# Patient Record
Sex: FEMALE | Race: BLACK OR AFRICAN AMERICAN | NOT HISPANIC OR LATINO
[De-identification: names, ages, dates, MRNs, and addresses within clinical notes are randomized per-mention and may not be internally consistent; named-entity substitution may affect disease eponyms.]

---

## 2020-10-27 ENCOUNTER — APPOINTMENT (OUTPATIENT)
Dept: HUMAN REPRODUCTION | Facility: CLINIC | Age: 35
End: 2020-10-27
Payer: COMMERCIAL

## 2020-10-27 PROCEDURE — 99203 OFFICE O/P NEW LOW 30 MIN: CPT | Mod: 95

## 2021-02-03 ENCOUNTER — TRANSCRIPTION ENCOUNTER (OUTPATIENT)
Age: 36
End: 2021-02-03

## 2021-02-03 ENCOUNTER — APPOINTMENT (OUTPATIENT)
Dept: HUMAN REPRODUCTION | Facility: CLINIC | Age: 36
End: 2021-02-03
Payer: COMMERCIAL

## 2021-02-03 PROCEDURE — 36415 COLL VENOUS BLD VENIPUNCTURE: CPT

## 2021-02-03 PROCEDURE — 99072 ADDL SUPL MATRL&STAF TM PHE: CPT

## 2021-03-29 ENCOUNTER — APPOINTMENT (OUTPATIENT)
Dept: HUMAN REPRODUCTION | Facility: CLINIC | Age: 36
End: 2021-03-29
Payer: COMMERCIAL

## 2021-03-29 PROCEDURE — 76831 ECHO EXAM UTERUS: CPT

## 2021-03-29 PROCEDURE — 99215 OFFICE O/P EST HI 40 MIN: CPT | Mod: 25

## 2021-03-29 PROCEDURE — 99072 ADDL SUPL MATRL&STAF TM PHE: CPT

## 2021-03-29 PROCEDURE — 58340 CATHETER FOR HYSTEROGRAPHY: CPT

## 2021-04-07 ENCOUNTER — APPOINTMENT (OUTPATIENT)
Dept: MATERNAL FETAL MEDICINE | Facility: CLINIC | Age: 36
End: 2021-04-07
Payer: COMMERCIAL

## 2021-04-07 DIAGNOSIS — Z31.69 ENCOUNTER FOR OTHER GENERAL COUNSELING AND ADVICE ON PROCREATION: ICD-10-CM

## 2021-04-07 DIAGNOSIS — Z78.9 OTHER SPECIFIED HEALTH STATUS: ICD-10-CM

## 2021-04-07 DIAGNOSIS — E66.9 OBESITY, UNSPECIFIED: ICD-10-CM

## 2021-04-07 DIAGNOSIS — Z82.49 FAMILY HISTORY OF ISCHEMIC HEART DISEASE AND OTHER DISEASES OF THE CIRCULATORY SYSTEM: ICD-10-CM

## 2021-04-07 DIAGNOSIS — Z83.3 FAMILY HISTORY OF DIABETES MELLITUS: ICD-10-CM

## 2021-04-07 DIAGNOSIS — E28.2 POLYCYSTIC OVARIAN SYNDROME: ICD-10-CM

## 2021-04-07 DIAGNOSIS — Z87.42 PERSONAL HISTORY OF OTHER DISEASES OF THE FEMALE GENITAL TRACT: ICD-10-CM

## 2021-04-07 DIAGNOSIS — Z80.3 FAMILY HISTORY OF MALIGNANT NEOPLASM OF BREAST: ICD-10-CM

## 2021-04-07 DIAGNOSIS — Z86.19 PERSONAL HISTORY OF OTHER INFECTIOUS AND PARASITIC DISEASES: ICD-10-CM

## 2021-04-07 DIAGNOSIS — N80.0 ENDOMETRIOSIS OF UTERUS: ICD-10-CM

## 2021-04-07 PROBLEM — Z00.00 ENCOUNTER FOR PREVENTIVE HEALTH EXAMINATION: Status: ACTIVE | Noted: 2021-04-07

## 2021-04-07 PROCEDURE — 99215 OFFICE O/P EST HI 40 MIN: CPT | Mod: 95

## 2021-05-03 ENCOUNTER — APPOINTMENT (OUTPATIENT)
Dept: HUMAN REPRODUCTION | Facility: CLINIC | Age: 36
End: 2021-05-03
Payer: COMMERCIAL

## 2021-05-03 VITALS — WEIGHT: 172 LBS | BODY MASS INDEX: 30.48 KG/M2 | HEIGHT: 63 IN

## 2021-05-03 PROBLEM — Z82.49 FAMILY HISTORY OF CORONARY ARTERY DISEASE: Status: ACTIVE | Noted: 2021-05-03

## 2021-05-03 PROBLEM — Z78.9 SOCIAL ALCOHOL USE: Status: ACTIVE | Noted: 2021-05-03

## 2021-05-03 PROBLEM — Z82.49 FAMILY HISTORY OF HYPERTENSION: Status: ACTIVE | Noted: 2021-05-03

## 2021-05-03 PROBLEM — Z83.3 FAMILY HISTORY OF DIABETES MELLITUS: Status: ACTIVE | Noted: 2021-05-03

## 2021-05-03 PROBLEM — Z86.19 HISTORY OF CHLAMYDIA INFECTION: Status: RESOLVED | Noted: 2021-05-03 | Resolved: 2021-05-03

## 2021-05-03 PROBLEM — E28.2 POLYCYSTIC OVARY SYNDROME: Status: ACTIVE | Noted: 2021-05-03

## 2021-05-03 PROBLEM — Z31.69 ENCOUNTER FOR PRECONCEPTION CONSULTATION: Status: ACTIVE | Noted: 2021-05-03

## 2021-05-03 PROBLEM — E66.9 OBESITY (BMI 30-39.9): Status: ACTIVE | Noted: 2021-04-07

## 2021-05-03 PROBLEM — Z78.9 DOES NOT USE ILLICIT DRUGS: Status: ACTIVE | Noted: 2021-05-03

## 2021-05-03 PROBLEM — N80.0 UTERUS, ADENOMYOSIS: Status: ACTIVE | Noted: 2021-05-03

## 2021-05-03 PROBLEM — Z80.3 FAMILY HISTORY OF MALIGNANT NEOPLASM OF BREAST: Status: ACTIVE | Noted: 2021-05-03

## 2021-05-03 PROBLEM — Z87.42 HISTORY OF CERVICAL INCOMPETENCE: Status: ACTIVE | Noted: 2021-05-03

## 2021-05-03 PROCEDURE — 84702 CHORIONIC GONADOTROPIN TEST: CPT

## 2021-05-03 PROCEDURE — 76830 TRANSVAGINAL US NON-OB: CPT

## 2021-05-03 PROCEDURE — 83001 ASSAY OF GONADOTROPIN (FSH): CPT | Mod: QW

## 2021-05-03 PROCEDURE — 99072 ADDL SUPL MATRL&STAF TM PHE: CPT

## 2021-05-03 PROCEDURE — 99213 OFFICE O/P EST LOW 20 MIN: CPT | Mod: 25

## 2021-05-03 PROCEDURE — 36415 COLL VENOUS BLD VENIPUNCTURE: CPT

## 2021-05-03 PROCEDURE — 82670 ASSAY OF TOTAL ESTRADIOL: CPT

## 2021-05-03 RX ORDER — FEXOFENADINE HYDROCHLORIDE 60 MG/1
60 TABLET, FILM COATED ORAL
Refills: 0 | Status: ACTIVE | COMMUNITY

## 2021-05-03 RX ORDER — CHROMIUM 200 MCG
TABLET ORAL
Refills: 0 | Status: ACTIVE | COMMUNITY

## 2021-05-03 NOTE — RESULTS/DATA
[FreeTextEntry1] : 2/3/2021 labs:\par HgB 11.8 g/dL\par Hemoglobin electrophoresis normal pattern. \par Hemoglobin A1C 5.0%\par TSH 3.28 uIU.mL\par Total cholesterol 189, triglycerides 58, HDL 72, \par HBsAg NR\par Hepatitis C Ab NR\par Rubella and varicella immune\par Syphilis screen negative\par O pos, antibody screen negative\par HIV NR\par \par \par \par 3/29/2021 labs\par Prolactin 34.3 (elevated)\par COVID 19 spike antibody domain negative

## 2021-05-03 NOTE — ACTIVE PROBLEMS
[Diabetes Mellitus] : no diabetes mellitus [Hypertension] : no hypertension [Heart Disease] : no heart disease [Autoimmune Disease] : no autoimmune disease [Renal Disease] : no kidney disease, no UTI [Neurologic Disorder] : no neurologic disorder, no epilepsy [Hepatic Disorder] : no hepatitis, no liver disease [Thrombophlebitis] : no varicosities, no phlebitis [Thyroid Disorder] : no thyroid dysfunction [FreeTextEntry1] : Lenora had a blood transfusion in 2017 after dilation and curettage for retained products of conception and has no objection to transfusion of blood products in the future in case of emergency. \par \par GYN Dr. Sarah Morrow ([facility])\par

## 2021-05-03 NOTE — DISCUSSION/SUMMARY
[FreeTextEntry1] : Her problems and my suggestions for her management are summarized below.  She was extensively counseled regarding the following issues:\par \par \par COVID-19 vaccination in preconception period:\par \par I explained that the development and use of mRNA vaccines is relatively new but that mRNA technology has been studied for decades. mRNA vaccines are not live virus vaccines; instead, the utilize the body’s own cells to generate the coronavirus spike protein that then stimulates immune cells to create antibodies against COVID-19. Based on the mechanism of action of these vaccines and the demonstrated safety and efficacy in available data, it is expected that the safety and efficacy profile of mRNA vaccines for pregnant individuals or those desiring pregnancy in the near future would be similar to that observed in the non-pregnant population. \par \par I reviewed the most common side effects, including fever, which has been associated with birth defects, most commonly neural tube defects, especially in women who are not taking appropriate doses of folic acid prior to pregnancy and during the early first trimester. \par \par I discussed that the choice to receive the vaccine prior to pregnancy is based on each individual's risk profile. . I reviewed that some data suggests that pregnant women are at increased risk for ICU admission,  birth, and severe/critical illness from COVID infection as compared to non-pregnant people. \par \par I reviewed that mRNA vaccines are not thought to contribute to infertility. Additionally, it is not necessary to delay pregnancy after completing both doses of the COVID-19 vaccine based on current ACOG recommendations. ACOG also recommends that if an individual becomes pregnant after the first dose of the COVID-19 vaccine series, the second dose should be administered as indicated.\par \par \par \par Obesity:\par \par Obesity during pregnancy is associated with maternal and  risks that increase with degree of obesity. Maternal weight and BMI are independent risk factors for development hypertensive disorders of pregnancy including preeclampsia. Obesity also increases the risk of medically indicated  delivery. Obesity is risk factor for complications of  delivery, including longer operative times, and increased risk of blood loss >1000 mL, wound infection, thromboembolism, and endometritis. \par \par There is an increased risk for macrosomia independent of gestational diabetes and potential shoulder dystocia. Fetal anatomic ultrasound images may be more difficult to interpret, and we discussed the limitations of ultrasound during pregnancy in obese patients. I reviewed that The [place] [employer] recommends that all classes of obese women gain no more than a total of 11 – 20 pounds in pregnancy.\par \par I reviewed dietary changes that may help her decrease her body mass index prior to pregnancy. A nutrition consultation can be considered. I also reviewed that 30 - 45 minutes of exercise three times weekly is suggested for improved cardiovascular fitness. I explained that loss of even 10% of her body weight could have a positive impact on pregnancy and fertility outcomes. I suggested a whole food, plant-based diet with avoidance of simple carbohydrates and added sugars and packaged foods. We reviewed this diet suggestion and the overall positive health benefits that this can have, both for when she is pregnant and outside of pregnancy.\par \par \par \par History of cervical insufficiency with abdominal cerclage in situ:\par \par Insufficient evidence exists to assess whether progesterone and cerclage together have an additive effect in reducing the risk of  birth in women at high risk for  birth. However, some form of progesterone therapy, either weekly 17-hydroxy progesterone or daily vaginal progesterone, has been shown to reduce the risk of recurrent spontaneous  birth (studied independently from ultrasound indicated cerclage) and concurrent use seems reasonable.\par \par In women who have undergone transabdominal cerclage placement, planned  delivery at 36 0/7 to 37 6/7 weeks of gestation or immediately at the onset of regular uterine contractions is suggested to avoid uterine rupture. At , the infant is delivered through a hysterotomy incision made above the cerclage. The cerclage can be left in place if future pregnancies are planned. \par \par There is no contraindication to pregnancy at this time and Ms. Smith may proceed with IVF at any time. \par \par \par \par This consultation was performed via telehealth using the [employer] video chat with real-time 2-way audio visual technology. Ms. Smith provided verbal consent to use the application at the time of consultation. She was physically present in her home and I was physically present off site. No other people were present for or participated in the consultation. At the end of our visit, the patient indicated that her questions were answered, and she seemed satisfied with our discussion. 57 minutes were spent with the patient on video chat with an additional 10 minutes spent for documentation and coordination of care.  Please do not hesitate to contact me with any questions.

## 2021-05-03 NOTE — SURGICAL HISTORY
[Abn Paps] : abnormal pap smear [Infertility] : infertility [Last Pap: ___] : Last Pap: [unfilled] [Fibroids] : no fibroids [Breast Disease] : no breast disease [STI's] : no STI's [Cysts] : no cysts [OC Use] : no OC use [de-identified] : Abnormal pap 16 years ago. Chlamydia infection treated in college.

## 2021-05-03 NOTE — REVIEW OF SYSTEMS
[Heart Rate Is Fast] : the heart rate was not fast [Chest Pain] : no chest pain [Palpitations] : no palpitations [Dyspnea] : no shortness of breath [SOB on Exertion] : no shortness of breath during exertion [Abn Vag Bleeding] : no abnormal vaginal bleeding [Easy Bleeding] : does not bleed easily [Easy Bruising] : does not bruise easily

## 2021-05-03 NOTE — HISTORY OF PRESENT ILLNESS
[FreeTextEntry1] : Thank you for referring Ms. Lenora Smith for Maternal Fetal Medicine preconception consultation. She is a 36 year old  LMP 3/31/2021 with regular menses who is planning pregnancy this summer. She has a history of a 20-week pregnancy loss secondary to cervical insufficiency. She plans on FET. \par \par She has an abdominal cerclage in situ (placed in 2018 in [place]). \par \par She is no longer with her partner but his family history is negative for genetic disorders and congenital anomalies to her knowledge. \par \par She has a strong family history of breast cancer and is being [facility]. Recent mammogram was normal per her report.\par \par Her blood pressure reading in your office was 130/77 mmHg. She has been taking her blood pressure measurements at home and reports all normal readings. \par \par \par  [Patient travelled to an area with active Zika Virus transmission in the last 6 months] : Patient is trying to get pregnant and she did not travel to an area with active Zika virus transmission in the last 6 months. [Patient's partner travelled to an area with active Zika Virus transmission in the last 6 months] : Patient's partner did not travel to an area with active Zika Virus transmission in the last 6 months

## 2021-05-03 NOTE — PAST MEDICAL HISTORY
[HIV Infection] : no HIV [Exposure To Gonorrhea] : no gonorrhea [Syphilis] : no syphilis [Herpes Simplex] : no genital herpes [Hepatitis, B Virus] : no Hepatitis B

## 2021-05-20 ENCOUNTER — APPOINTMENT (OUTPATIENT)
Dept: HUMAN REPRODUCTION | Facility: CLINIC | Age: 36
End: 2021-05-20
Payer: COMMERCIAL

## 2021-05-20 ENCOUNTER — RESULT REVIEW (OUTPATIENT)
Age: 36
End: 2021-05-20

## 2021-05-20 PROCEDURE — 99213 OFFICE O/P EST LOW 20 MIN: CPT | Mod: 25

## 2021-05-20 PROCEDURE — 36415 COLL VENOUS BLD VENIPUNCTURE: CPT

## 2021-05-20 PROCEDURE — 99072 ADDL SUPL MATRL&STAF TM PHE: CPT

## 2021-05-20 PROCEDURE — 76830 TRANSVAGINAL US NON-OB: CPT

## 2021-05-20 PROCEDURE — 84702 CHORIONIC GONADOTROPIN TEST: CPT

## 2021-05-20 PROCEDURE — 83001 ASSAY OF GONADOTROPIN (FSH): CPT | Mod: QW

## 2021-05-20 PROCEDURE — 82670 ASSAY OF TOTAL ESTRADIOL: CPT

## 2021-05-27 ENCOUNTER — APPOINTMENT (OUTPATIENT)
Dept: HUMAN REPRODUCTION | Facility: CLINIC | Age: 36
End: 2021-05-27
Payer: COMMERCIAL

## 2021-05-27 PROCEDURE — 76830 TRANSVAGINAL US NON-OB: CPT

## 2021-05-27 PROCEDURE — 99072 ADDL SUPL MATRL&STAF TM PHE: CPT

## 2021-05-27 PROCEDURE — 36415 COLL VENOUS BLD VENIPUNCTURE: CPT

## 2021-05-27 PROCEDURE — 99213 OFFICE O/P EST LOW 20 MIN: CPT | Mod: 25

## 2021-06-03 ENCOUNTER — APPOINTMENT (OUTPATIENT)
Dept: HUMAN REPRODUCTION | Facility: CLINIC | Age: 36
End: 2021-06-03
Payer: COMMERCIAL

## 2021-06-03 PROCEDURE — 99213 OFFICE O/P EST LOW 20 MIN: CPT | Mod: 25

## 2021-06-03 PROCEDURE — 36415 COLL VENOUS BLD VENIPUNCTURE: CPT

## 2021-06-03 PROCEDURE — 82670 ASSAY OF TOTAL ESTRADIOL: CPT

## 2021-06-03 PROCEDURE — 84144 ASSAY OF PROGESTERONE: CPT

## 2021-06-03 PROCEDURE — 76830 TRANSVAGINAL US NON-OB: CPT

## 2021-06-03 PROCEDURE — 99072 ADDL SUPL MATRL&STAF TM PHE: CPT

## 2021-06-07 ENCOUNTER — APPOINTMENT (OUTPATIENT)
Dept: HUMAN REPRODUCTION | Facility: CLINIC | Age: 36
End: 2021-06-07
Payer: COMMERCIAL

## 2021-06-07 PROCEDURE — 36415 COLL VENOUS BLD VENIPUNCTURE: CPT

## 2021-06-07 PROCEDURE — 99072 ADDL SUPL MATRL&STAF TM PHE: CPT

## 2021-06-07 PROCEDURE — 84144 ASSAY OF PROGESTERONE: CPT

## 2021-06-08 ENCOUNTER — APPOINTMENT (OUTPATIENT)
Dept: HUMAN REPRODUCTION | Facility: CLINIC | Age: 36
End: 2021-06-08
Payer: COMMERCIAL

## 2021-06-08 PROCEDURE — 89352 THAWING CRYOPRESRVED EMBRYO: CPT

## 2021-06-08 PROCEDURE — 58974 EMBRYO TRANSFER INTRAUTERINE: CPT

## 2021-06-08 PROCEDURE — 76998 US GUIDE INTRAOP: CPT

## 2021-06-08 PROCEDURE — 89255 PREPARE EMBRYO FOR TRANSFER: CPT

## 2021-06-08 PROCEDURE — 99072 ADDL SUPL MATRL&STAF TM PHE: CPT

## 2021-06-18 ENCOUNTER — APPOINTMENT (OUTPATIENT)
Dept: HUMAN REPRODUCTION | Facility: CLINIC | Age: 36
End: 2021-06-18
Payer: COMMERCIAL

## 2021-06-18 PROCEDURE — 36415 COLL VENOUS BLD VENIPUNCTURE: CPT

## 2021-06-18 PROCEDURE — 84144 ASSAY OF PROGESTERONE: CPT

## 2021-06-18 PROCEDURE — 99072 ADDL SUPL MATRL&STAF TM PHE: CPT

## 2021-06-18 PROCEDURE — 84702 CHORIONIC GONADOTROPIN TEST: CPT

## 2021-06-21 ENCOUNTER — APPOINTMENT (OUTPATIENT)
Dept: HUMAN REPRODUCTION | Facility: CLINIC | Age: 36
End: 2021-06-21
Payer: COMMERCIAL

## 2021-06-21 PROCEDURE — 99072 ADDL SUPL MATRL&STAF TM PHE: CPT

## 2021-06-21 PROCEDURE — 84702 CHORIONIC GONADOTROPIN TEST: CPT

## 2021-06-21 PROCEDURE — 36415 COLL VENOUS BLD VENIPUNCTURE: CPT

## 2021-07-01 ENCOUNTER — APPOINTMENT (OUTPATIENT)
Dept: HUMAN REPRODUCTION | Facility: CLINIC | Age: 36
End: 2021-07-01
Payer: COMMERCIAL

## 2021-07-01 PROCEDURE — 76817 TRANSVAGINAL US OBSTETRIC: CPT

## 2021-07-01 PROCEDURE — 99072 ADDL SUPL MATRL&STAF TM PHE: CPT

## 2021-07-01 PROCEDURE — 99214 OFFICE O/P EST MOD 30 MIN: CPT | Mod: 25

## 2021-07-08 ENCOUNTER — APPOINTMENT (OUTPATIENT)
Dept: HUMAN REPRODUCTION | Facility: CLINIC | Age: 36
End: 2021-07-08
Payer: COMMERCIAL

## 2021-07-08 PROCEDURE — 99072 ADDL SUPL MATRL&STAF TM PHE: CPT

## 2021-07-08 PROCEDURE — 76817 TRANSVAGINAL US OBSTETRIC: CPT

## 2021-07-08 PROCEDURE — 99213 OFFICE O/P EST LOW 20 MIN: CPT | Mod: 25

## 2021-08-19 ENCOUNTER — ASOB RESULT (OUTPATIENT)
Age: 36
End: 2021-08-19

## 2021-08-19 ENCOUNTER — APPOINTMENT (OUTPATIENT)
Dept: ANTEPARTUM | Facility: CLINIC | Age: 36
End: 2021-08-19
Payer: COMMERCIAL

## 2021-08-19 PROCEDURE — 36416 COLLJ CAPILLARY BLOOD SPEC: CPT

## 2021-08-19 PROCEDURE — 76801 OB US < 14 WKS SINGLE FETUS: CPT

## 2021-08-19 PROCEDURE — 76813 OB US NUCHAL MEAS 1 GEST: CPT

## 2021-09-10 ENCOUNTER — ASOB RESULT (OUTPATIENT)
Age: 36
End: 2021-09-10

## 2021-09-10 ENCOUNTER — APPOINTMENT (OUTPATIENT)
Dept: ANTEPARTUM | Facility: CLINIC | Age: 36
End: 2021-09-10
Payer: COMMERCIAL

## 2021-09-10 PROCEDURE — 76815 OB US LIMITED FETUS(S): CPT

## 2021-09-10 PROCEDURE — 76817 TRANSVAGINAL US OBSTETRIC: CPT

## 2021-09-10 RX ORDER — PROGESTERONE 200 MG/1
200 CAPSULE ORAL
Qty: 90 | Refills: 3 | Status: ACTIVE | COMMUNITY
Start: 2021-09-10 | End: 1900-01-01

## 2021-10-08 ENCOUNTER — APPOINTMENT (OUTPATIENT)
Dept: ANTEPARTUM | Facility: CLINIC | Age: 36
End: 2021-10-08
Payer: COMMERCIAL

## 2021-10-08 ENCOUNTER — ASOB RESULT (OUTPATIENT)
Age: 36
End: 2021-10-08

## 2021-10-08 PROCEDURE — 76817 TRANSVAGINAL US OBSTETRIC: CPT | Mod: 59

## 2021-10-08 PROCEDURE — 76811 OB US DETAILED SNGL FETUS: CPT

## 2021-10-27 ENCOUNTER — APPOINTMENT (OUTPATIENT)
Dept: ANTEPARTUM | Facility: CLINIC | Age: 36
End: 2021-10-27
Payer: COMMERCIAL

## 2021-10-27 ENCOUNTER — ASOB RESULT (OUTPATIENT)
Age: 36
End: 2021-10-27

## 2021-10-27 PROCEDURE — 93325 DOPPLER ECHO COLOR FLOW MAPG: CPT

## 2021-10-27 PROCEDURE — 76827 ECHO EXAM OF FETAL HEART: CPT

## 2021-10-27 PROCEDURE — 76825 ECHO EXAM OF FETAL HEART: CPT

## 2021-10-27 PROCEDURE — 76817 TRANSVAGINAL US OBSTETRIC: CPT

## 2021-10-29 ENCOUNTER — APPOINTMENT (OUTPATIENT)
Dept: PEDIATRIC CARDIOLOGY | Facility: CLINIC | Age: 36
End: 2021-10-29
Payer: COMMERCIAL

## 2021-10-29 PROCEDURE — 76820 UMBILICAL ARTERY ECHO: CPT

## 2021-10-29 PROCEDURE — 76825 ECHO EXAM OF FETAL HEART: CPT

## 2021-10-29 PROCEDURE — 93325 DOPPLER ECHO COLOR FLOW MAPG: CPT | Mod: 59

## 2021-10-29 PROCEDURE — 76821 MIDDLE CEREBRAL ARTERY ECHO: CPT

## 2021-10-29 PROCEDURE — 76827 ECHO EXAM OF FETAL HEART: CPT

## 2021-11-02 ENCOUNTER — NON-APPOINTMENT (OUTPATIENT)
Age: 36
End: 2021-11-02

## 2021-12-03 ENCOUNTER — APPOINTMENT (OUTPATIENT)
Dept: ANTEPARTUM | Facility: CLINIC | Age: 36
End: 2021-12-03
Payer: COMMERCIAL

## 2021-12-03 ENCOUNTER — ASOB RESULT (OUTPATIENT)
Age: 36
End: 2021-12-03

## 2021-12-03 PROCEDURE — 76816 OB US FOLLOW-UP PER FETUS: CPT

## 2021-12-03 PROCEDURE — 76819 FETAL BIOPHYS PROFIL W/O NST: CPT

## 2021-12-10 ENCOUNTER — APPOINTMENT (OUTPATIENT)
Dept: PEDIATRIC CARDIOLOGY | Facility: CLINIC | Age: 36
End: 2021-12-10
Payer: COMMERCIAL

## 2021-12-10 PROCEDURE — 76826 ECHO EXAM OF FETAL HEART: CPT

## 2021-12-10 PROCEDURE — 76828 ECHO EXAM OF FETAL HEART: CPT

## 2021-12-10 PROCEDURE — 76821 MIDDLE CEREBRAL ARTERY ECHO: CPT

## 2021-12-10 PROCEDURE — 76820 UMBILICAL ARTERY ECHO: CPT

## 2021-12-10 PROCEDURE — 93325 DOPPLER ECHO COLOR FLOW MAPG: CPT | Mod: 59

## 2021-12-21 ENCOUNTER — NON-APPOINTMENT (OUTPATIENT)
Age: 36
End: 2021-12-21

## 2022-01-14 ENCOUNTER — FORM ENCOUNTER (OUTPATIENT)
Age: 37
End: 2022-01-14

## 2022-01-14 ENCOUNTER — APPOINTMENT (OUTPATIENT)
Age: 37
End: 2022-01-14
Payer: COMMERCIAL

## 2022-01-14 PROCEDURE — ZZZZZ: CPT

## 2022-01-15 ENCOUNTER — TRANSCRIPTION ENCOUNTER (OUTPATIENT)
Age: 37
End: 2022-01-15

## 2022-01-16 ENCOUNTER — EMERGENCY (EMERGENCY)
Facility: HOSPITAL | Age: 37
LOS: 1 days | Discharge: HOME | End: 2022-01-16
Admitting: EMERGENCY MEDICINE
Payer: COMMERCIAL

## 2022-01-16 ENCOUNTER — APPOINTMENT (OUTPATIENT)
Age: 37
End: 2022-01-16

## 2022-01-16 VITALS
SYSTOLIC BLOOD PRESSURE: 102 MMHG | DIASTOLIC BLOOD PRESSURE: 63 MMHG | RESPIRATION RATE: 18 BRPM | TEMPERATURE: 99 F | OXYGEN SATURATION: 98 % | HEART RATE: 101 BPM

## 2022-01-16 VITALS
RESPIRATION RATE: 18 BRPM | HEART RATE: 108 BPM | HEIGHT: 63 IN | SYSTOLIC BLOOD PRESSURE: 103 MMHG | WEIGHT: 184.09 LBS | OXYGEN SATURATION: 97 % | DIASTOLIC BLOOD PRESSURE: 68 MMHG | TEMPERATURE: 99 F

## 2022-01-16 DIAGNOSIS — R05.9 COUGH, UNSPECIFIED: ICD-10-CM

## 2022-01-16 DIAGNOSIS — R50.9 FEVER, UNSPECIFIED: ICD-10-CM

## 2022-01-16 DIAGNOSIS — O99.891 OTHER SPECIFIED DISEASES AND CONDITIONS COMPLICATING PREGNANCY: ICD-10-CM

## 2022-01-16 DIAGNOSIS — Z3A.35 35 WEEKS GESTATION OF PREGNANCY: ICD-10-CM

## 2022-01-16 PROCEDURE — L9998: CPT

## 2022-01-16 PROCEDURE — M0247: CPT

## 2022-01-16 RX ORDER — FAMOTIDINE 10 MG/ML
20 INJECTION INTRAVENOUS ONCE
Refills: 0 | Status: DISCONTINUED | OUTPATIENT
Start: 2022-01-16 | End: 2022-01-20

## 2022-01-16 RX ORDER — DIPHENHYDRAMINE HCL 50 MG
50 CAPSULE ORAL ONCE
Refills: 0 | Status: DISCONTINUED | OUTPATIENT
Start: 2022-01-16 | End: 2022-01-20

## 2022-01-16 RX ORDER — EPINEPHRINE 0.3 MG/.3ML
0.3 INJECTION INTRAMUSCULAR; SUBCUTANEOUS ONCE
Refills: 0 | Status: DISCONTINUED | OUTPATIENT
Start: 2022-01-16 | End: 2022-01-20

## 2022-01-16 RX ORDER — SOTROVIMAB 62.5 MG/ML
500 INJECTION, SOLUTION, CONCENTRATE INTRAVENOUS ONCE
Refills: 0 | Status: COMPLETED | OUTPATIENT
Start: 2022-01-16 | End: 2022-01-16

## 2022-01-16 RX ORDER — SODIUM CHLORIDE 9 MG/ML
250 INJECTION INTRAMUSCULAR; INTRAVENOUS; SUBCUTANEOUS
Refills: 0 | Status: COMPLETED | OUTPATIENT
Start: 2022-01-16 | End: 2022-01-16

## 2022-01-16 RX ADMIN — SOTROVIMAB 500 MILLIGRAM(S): 62.5 INJECTION, SOLUTION, CONCENTRATE INTRAVENOUS at 16:45

## 2022-01-16 RX ADMIN — SODIUM CHLORIDE 250 MILLILITER(S): 9 INJECTION INTRAMUSCULAR; INTRAVENOUS; SUBCUTANEOUS at 16:55

## 2022-01-16 RX ADMIN — SODIUM CHLORIDE 100 MILLILITER(S): 9 INJECTION INTRAMUSCULAR; INTRAVENOUS; SUBCUTANEOUS at 16:55

## 2022-01-16 RX ADMIN — SOTROVIMAB 116 MILLIGRAM(S): 62.5 INJECTION, SOLUTION, CONCENTRATE INTRAVENOUS at 16:15

## 2022-01-16 NOTE — ED PROVIDER NOTE - CLINICAL SUMMARY MEDICAL DECISION MAKING FREE TEXT BOX
Patient has history of COVID-19 symptoms for [ 5 ] days    Patient has high risk factors that include:  [  ] Chronic Kidney Disease [  ] Diabetes Mellitus [  ] Immune Suppressive [  ] Receiving Immunosuppressive Treatment [  ] Overweight/Obesity BMI greater than 25 [  ] Age greater than or equal 65 years [  ] Cardiovascular Disease [  ] HTN [  ] COPD/Other Chronic Respiratory Disease [  ] Sickle Cell Disease [  ] Congenital/Acquired Heart Disease [  ] Neurodevelopmental Disorder [  ] Medical related technology dependence [  ] Asthma/Chronic Respiratory Disease [  ] Immune Suppressive Disease [  ] Receiving Immune Suppressive Therapy [  ] Other: pregnant    Patient received prior COVID treatment that included [NA  ]  Patient provided explanation of monoclonal antibody infusion and is in agreement with treatment plan. See consent form in medical record.    [ X ] Patient received monoclonal antibody infusion with no adverse reaction. Patient planned for discharge home and follow up with outpatient [facility] program.    [  ] Patient received monoclonal antibody infusion and experienced an adverse reaction. Symptoms include [  ]. Patient treated with [  ]. Patient planned for [  ]

## 2022-01-16 NOTE — ED PROVIDER NOTE - PHYSICAL EXAMINATION
Gen: well appearing, no acute distress  Skin: warm/dry, no rash noted  Resp: breathing comfortably, speaking in full sentences, no dyspnea  Abd: gravid, soft, nontender  Neuro: alert/oriented, ambulatory

## 2022-01-16 NOTE — ED ADULT TRIAGE NOTE - CHIEF COMPLAINT QUOTE
MAb infusion . Patient alert and oriented presented to ed for MA infusion . Patient tested positive on 1/15/2022. Symptoms started on 1/14/2022 such as cough fever bodyaches chills and sorethroat . PAtient is 34 weeks pregnant. Denies any chest pain or sob .

## 2022-01-16 NOTE — ED PROVIDER NOTE - PATIENT PORTAL LINK FT
You can access the FollowMyHealth Patient Portal offered by [facility] by registering at the following website: [email or web address]. By joining [facility]’s FollowMyHealth portal, you will also be able to view your health information using other applications (apps) compatible with our system.

## 2022-01-16 NOTE — ED PROVIDER NOTE - OBJECTIVE STATEMENT
Patient is scheduled for MAB Infusion and requesting no professional billing.  36 F 35 weeks preg, + covid test yesterday here for MAD infusion.  pt reports cough, bodyaches, congestion, fever.  no chest pain/sob, abd pain, vaginal bleeding/discharge

## 2022-01-16 NOTE — ED ADULT NURSE NOTE - OBJECTIVE STATEMENT
Patient alert and oriented presented to ed for MA infusion . Patient tested positive on 1/15/2022. Symptoms started on 1/14/2022 such as cough fever bodyaches chills and sorethroat . PAtient is 34 weeks pregnant. Denies any chest pain or sob .

## 2022-01-20 ENCOUNTER — APPOINTMENT (OUTPATIENT)
Dept: ANTEPARTUM | Facility: CLINIC | Age: 37
End: 2022-01-20

## 2022-01-24 ENCOUNTER — ASOB RESULT (OUTPATIENT)
Age: 37
End: 2022-01-24

## 2022-01-24 ENCOUNTER — APPOINTMENT (OUTPATIENT)
Dept: ANTEPARTUM | Facility: CLINIC | Age: 37
End: 2022-01-24
Payer: COMMERCIAL

## 2022-01-24 ENCOUNTER — APPOINTMENT (OUTPATIENT)
Dept: ANTEPARTUM | Facility: CLINIC | Age: 37
End: 2022-01-24

## 2022-01-24 PROCEDURE — 76816 OB US FOLLOW-UP PER FETUS: CPT

## 2022-01-24 PROCEDURE — 76819 FETAL BIOPHYS PROFIL W/O NST: CPT

## 2022-01-26 ENCOUNTER — OUTPATIENT (OUTPATIENT)
Dept: OUTPATIENT SERVICES | Facility: HOSPITAL | Age: 37
LOS: 1 days | End: 2022-01-26
Payer: COMMERCIAL

## 2022-01-26 DIAGNOSIS — Z01.818 ENCOUNTER FOR OTHER PREPROCEDURAL EXAMINATION: ICD-10-CM

## 2022-01-26 LAB
HCT VFR BLD CALC: 34.7 % — SIGNIFICANT CHANGE UP (ref 34.5–45)
HGB BLD-MCNC: 11.1 G/DL — LOW (ref 11.5–15.5)
MCHC RBC-ENTMCNC: 31.3 PG — SIGNIFICANT CHANGE UP (ref 27–34)
MCHC RBC-ENTMCNC: 32 GM/DL — SIGNIFICANT CHANGE UP (ref 32–36)
MCV RBC AUTO: 97.7 FL — SIGNIFICANT CHANGE UP (ref 80–100)
NRBC # BLD: 0 /100 WBCS — SIGNIFICANT CHANGE UP (ref 0–0)
PLATELET # BLD AUTO: 302 K/UL — SIGNIFICANT CHANGE UP (ref 150–400)
RBC # BLD: 3.55 M/UL — LOW (ref 3.8–5.2)
RBC # FLD: 13.5 % — SIGNIFICANT CHANGE UP (ref 10.3–14.5)
WBC # BLD: 7.33 K/UL — SIGNIFICANT CHANGE UP (ref 3.8–10.5)
WBC # FLD AUTO: 7.33 K/UL — SIGNIFICANT CHANGE UP (ref 3.8–10.5)

## 2022-01-26 PROCEDURE — 85027 COMPLETE CBC AUTOMATED: CPT

## 2022-01-28 ENCOUNTER — OUTPATIENT (OUTPATIENT)
Dept: OUTPATIENT SERVICES | Facility: HOSPITAL | Age: 37
LOS: 1 days | End: 2022-01-28

## 2022-01-28 VITALS
HEIGHT: 64 IN | SYSTOLIC BLOOD PRESSURE: 130 MMHG | WEIGHT: 186.07 LBS | DIASTOLIC BLOOD PRESSURE: 80 MMHG | TEMPERATURE: 98 F | OXYGEN SATURATION: 98 % | RESPIRATION RATE: 16 BRPM | HEART RATE: 82 BPM

## 2022-01-28 DIAGNOSIS — Z98.890 OTHER SPECIFIED POSTPROCEDURAL STATES: Chronic | ICD-10-CM

## 2022-01-28 DIAGNOSIS — O34.40 MATERNAL CARE FOR OTHER ABNORMALITIES OF CERVIX, UNSPECIFIED TRIMESTER: ICD-10-CM

## 2022-01-28 DIAGNOSIS — O35.8XX0 MATERNAL CARE FOR OTHER (SUSPECTED) FETAL ABNORMALITY AND DAMAGE, NOT APPLICABLE OR UNSPECIFIED: ICD-10-CM

## 2022-01-28 DIAGNOSIS — O28.3 ABNORMAL ULTRASONIC FINDING ON ANTENATAL SCREENING OF MOTHER: ICD-10-CM

## 2022-01-28 DIAGNOSIS — Z87.42 PERSONAL HISTORY OF OTHER DISEASES OF THE FEMALE GENITAL TRACT: Chronic | ICD-10-CM

## 2022-01-28 LAB
APPEARANCE UR: ABNORMAL
BILIRUB UR-MCNC: NEGATIVE — SIGNIFICANT CHANGE UP
BLD GP AB SCN SERPL QL: NEGATIVE — SIGNIFICANT CHANGE UP
COLOR SPEC: SIGNIFICANT CHANGE UP
DIFF PNL FLD: NEGATIVE — SIGNIFICANT CHANGE UP
GLUCOSE UR QL: NEGATIVE — SIGNIFICANT CHANGE UP
HCT VFR BLD CALC: 35.6 % — SIGNIFICANT CHANGE UP (ref 34.5–45)
HGB BLD-MCNC: 11.9 G/DL — SIGNIFICANT CHANGE UP (ref 11.5–15.5)
KETONES UR-MCNC: NEGATIVE — SIGNIFICANT CHANGE UP
LEUKOCYTE ESTERASE UR-ACNC: NEGATIVE — SIGNIFICANT CHANGE UP
MCHC RBC-ENTMCNC: 32.5 PG — SIGNIFICANT CHANGE UP (ref 27–34)
MCHC RBC-ENTMCNC: 33.4 GM/DL — SIGNIFICANT CHANGE UP (ref 32–36)
MCV RBC AUTO: 97.3 FL — SIGNIFICANT CHANGE UP (ref 80–100)
NITRITE UR-MCNC: NEGATIVE — SIGNIFICANT CHANGE UP
NRBC # BLD: 0 /100 WBCS — SIGNIFICANT CHANGE UP
NRBC # FLD: 0 K/UL — SIGNIFICANT CHANGE UP
PH UR: 7 — SIGNIFICANT CHANGE UP (ref 5–8)
PLATELET # BLD AUTO: 300 K/UL — SIGNIFICANT CHANGE UP (ref 150–400)
PROT UR-MCNC: NEGATIVE — SIGNIFICANT CHANGE UP
RBC # BLD: 3.66 M/UL — LOW (ref 3.8–5.2)
RBC # FLD: 13.5 % — SIGNIFICANT CHANGE UP (ref 10.3–14.5)
RH IG SCN BLD-IMP: POSITIVE — SIGNIFICANT CHANGE UP
SP GR SPEC: 1.01 — SIGNIFICANT CHANGE UP (ref 1–1.05)
UROBILINOGEN FLD QL: SIGNIFICANT CHANGE UP
WBC # BLD: 8.78 K/UL — SIGNIFICANT CHANGE UP (ref 3.8–10.5)
WBC # FLD AUTO: 8.78 K/UL — SIGNIFICANT CHANGE UP (ref 3.8–10.5)

## 2022-01-28 RX ORDER — VALACYCLOVIR 500 MG/1
1 TABLET, FILM COATED ORAL
Qty: 0 | Refills: 0 | DISCHARGE

## 2022-01-28 RX ORDER — SODIUM CHLORIDE 9 MG/ML
1000 INJECTION, SOLUTION INTRAVENOUS
Refills: 0 | Status: DISCONTINUED | OUTPATIENT
Start: 2022-02-03 | End: 2022-02-04

## 2022-01-28 NOTE — OB PST NOTE - NSHPREVIEWOFSYSTEMS_GEN_ALL_CORE
General: No fever, chills, sweating, anorexia, weight loss or weight gain. No polyphagia, polyurea, polydypsia, malaise, or fatigue    Skin: No rashes, itching, or dryness. No change in size/color of moles. No tumors, brittle nails, pitted nails, or hair loss    Breast: No tenderness, lumps, or nipple discharge      Ophthalmologic: No diplopia, photophobia, lacrimation, blurred Vision , or eye discharge    ENMT Symptoms: No hearing difficulty, ear pain, tinnitus, or vertigo. No sinus symptoms, nasal congestion, nasal   discharge, or nasal obstruction    Respiratory and Thorax: No wheezing, dyspnea, cough, hemoptysis, or pleuritic chest pPain     Cardiovascular: No chest pain, palpitations, dyspnea on exertion, orthopnea, paroxysmal nocturnal dyspnea,   peripheral edema, or claudication    Gastrointestinal: No nausea, vomiting, diarrhea, constipation, change in bowel habits, flatulence, abdominal pain, or melena    Genitourinary/ Pelvis:  Abdominal cerclage present No hematuria, renal colic, or flank pain.  No urine discoloration, incontinence, dysuria, or urinary hesitancy. Normal urinary frequency. No nocturia, abnormal vaginal bleeding, vaginal discharge, spotting, pelvic pain, or vaginal leakage    Musculoskeletal: No arthralgia, arthritis, joint swelling, muscle cramping, muscle weakness, neck pain, arm pain, or leg pain    Neurological: No transient paralysis, weakness, paresthesias, or seizures. No syncope, tremors, vertigo, loss of sensation, difficulty walking, loss of consciousness, hemiparesis, confusion, or facial palsy    Psychiatric: No suicidal ideation, depression, anxiety, insomnia, memory loss, paranoia, mood swings, agitation, hallucinations, or hyperactivity    Hematology: No gum bleeding, nose bleeding, or skin lumps    Lymphatic: No enlarged or tender lymph nodes. No extremity swelling    Endocrine: No heat or cold intolerance    Immunologic: No recurrent or persistent infections

## 2022-01-28 NOTE — OB PST NOTE - NSICDXPASTMEDICALHX_GEN_ALL_CORE_FT
PAST MEDICAL HISTORY:  Complete miscarriage 5 weeks gestation    H/O cervical incompetence     H/O fetal loss 21 weeks gestation    Herpes simplex never had lesions  per pt     PAST MEDICAL HISTORY:  Complete miscarriage 5 weeks gestation    COVID-19 1/14/22,  received Monolclona antibodies 1/16/22    H/O cervical incompetence     H/O fetal loss 21 weeks gestation    Herpes simplex never had lesions  per pt

## 2022-01-28 NOTE — OB PST NOTE - ACTIVITY
pt able to perform usual activities wihtout difficulty pt able to perform usual activities without difficulty

## 2022-01-28 NOTE — OB PST NOTE - NSMATERNALFETALCONCERNS_OBGYN_ALL_OB_FT
MATERNAL FETAL ALERT  AMA, IVF , transabdominal cerclage in place   fetal echo reveals mild narrowing at the isthmus , discrete coarctation of the aorta cannot be ruled out   NICU admit with Peds Cardiology evaluation within a few hours after birth   Jennifer Martini RN 11/9/2021

## 2022-01-28 NOTE — OB PST NOTE - HISTORY OF PRESENT ILLNESS
37 y/o female with hx of fetal loss at 21 weeks gestation .  Pt had transabdominal cerclage placed  . Pt with EDC 21, Scheduled for Primary  section.      Pt reports she feels baby moving today as usual

## 2022-01-28 NOTE — OB PST NOTE - ASSESSMENT
35 y/o female with hx of fetal loss at 21 weeks gestation .  Pt had transabdominal cerclage placed  . Pt with EDC 21, Scheduled for Primary  section.

## 2022-01-28 NOTE — OB PST NOTE - PROBLEM SELECTOR PLAN 1
Primary  section.    Presence of abdominal cerclage    CBC UA T&S  Preop instructions and antibacterial soap given and explained (verbal and written), with teach back.

## 2022-01-28 NOTE — OB PST NOTE - NSANTHOSAYNRD_GEN_A_CORE
No. BASIA screening performed.  STOP BANG Legend: 0-2 = LOW Risk; 3-4 = INTERMEDIATE Risk; 5-8 = HIGH Risk

## 2022-02-01 ENCOUNTER — TRANSCRIPTION ENCOUNTER (OUTPATIENT)
Age: 37
End: 2022-02-01

## 2022-02-01 ENCOUNTER — NON-APPOINTMENT (OUTPATIENT)
Age: 37
End: 2022-02-01

## 2022-02-02 ENCOUNTER — TRANSCRIPTION ENCOUNTER (OUTPATIENT)
Age: 37
End: 2022-02-02

## 2022-02-03 ENCOUNTER — INPATIENT (INPATIENT)
Facility: HOSPITAL | Age: 37
LOS: 2 days | Discharge: HOME | End: 2022-02-06
Attending: OBSTETRICS & GYNECOLOGY | Admitting: OBSTETRICS & GYNECOLOGY
Payer: COMMERCIAL

## 2022-02-03 VITALS — HEART RATE: 91 BPM | DIASTOLIC BLOOD PRESSURE: 70 MMHG | SYSTOLIC BLOOD PRESSURE: 113 MMHG

## 2022-02-03 DIAGNOSIS — Z87.42 PERSONAL HISTORY OF OTHER DISEASES OF THE FEMALE GENITAL TRACT: Chronic | ICD-10-CM

## 2022-02-03 DIAGNOSIS — O35.8XX0 MATERNAL CARE FOR OTHER (SUSPECTED) FETAL ABNORMALITY AND DAMAGE, NOT APPLICABLE OR UNSPECIFIED: ICD-10-CM

## 2022-02-03 DIAGNOSIS — Z98.890 OTHER SPECIFIED POSTPROCEDURAL STATES: Chronic | ICD-10-CM

## 2022-02-03 LAB
BLD GP AB SCN SERPL QL: NEGATIVE — SIGNIFICANT CHANGE UP
COVID-19 SPIKE DOMAIN AB INTERP: POSITIVE
COVID-19 SPIKE DOMAIN ANTIBODY RESULT: >250 U/ML — HIGH
HCT VFR BLD CALC: 34.3 % — LOW (ref 34.5–45)
HGB BLD-MCNC: 11.4 G/DL — LOW (ref 11.5–15.5)
MCHC RBC-ENTMCNC: 32.1 PG — SIGNIFICANT CHANGE UP (ref 27–34)
MCHC RBC-ENTMCNC: 33.2 GM/DL — SIGNIFICANT CHANGE UP (ref 32–36)
MCV RBC AUTO: 96.6 FL — SIGNIFICANT CHANGE UP (ref 80–100)
NRBC # BLD: 0 /100 WBCS — SIGNIFICANT CHANGE UP
NRBC # FLD: 0 K/UL — SIGNIFICANT CHANGE UP
PLATELET # BLD AUTO: 240 K/UL — SIGNIFICANT CHANGE UP (ref 150–400)
RBC # BLD: 3.55 M/UL — LOW (ref 3.8–5.2)
RBC # FLD: 13.3 % — SIGNIFICANT CHANGE UP (ref 10.3–14.5)
RH IG SCN BLD-IMP: POSITIVE — SIGNIFICANT CHANGE UP
SARS-COV-2 IGG+IGM SERPL QL IA: >250 U/ML — HIGH
SARS-COV-2 IGG+IGM SERPL QL IA: POSITIVE
T PALLIDUM AB TITR SER: NEGATIVE — SIGNIFICANT CHANGE UP
WBC # BLD: 7.52 K/UL — SIGNIFICANT CHANGE UP (ref 3.8–10.5)
WBC # FLD AUTO: 7.52 K/UL — SIGNIFICANT CHANGE UP (ref 3.8–10.5)

## 2022-02-03 PROCEDURE — 99221 1ST HOSP IP/OBS SF/LOW 40: CPT

## 2022-02-03 DEVICE — SURGICEL SNOW 2 X 4": Type: IMPLANTABLE DEVICE | Status: FUNCTIONAL

## 2022-02-03 RX ORDER — NALBUPHINE HYDROCHLORIDE 10 MG/ML
2.5 INJECTION, SOLUTION INTRAMUSCULAR; INTRAVENOUS; SUBCUTANEOUS EVERY 6 HOURS
Refills: 0 | Status: DISCONTINUED | OUTPATIENT
Start: 2022-02-03 | End: 2022-02-04

## 2022-02-03 RX ORDER — OXYTOCIN 10 UNIT/ML
333.33 VIAL (ML) INJECTION
Qty: 20 | Refills: 0 | Status: DISCONTINUED | OUTPATIENT
Start: 2022-02-03 | End: 2022-02-04

## 2022-02-03 RX ORDER — DIPHENHYDRAMINE HCL 50 MG
25 CAPSULE ORAL EVERY 6 HOURS
Refills: 0 | Status: DISCONTINUED | OUTPATIENT
Start: 2022-02-03 | End: 2022-02-06

## 2022-02-03 RX ORDER — LANOLIN
1 OINTMENT (GRAM) TOPICAL EVERY 6 HOURS
Refills: 0 | Status: DISCONTINUED | OUTPATIENT
Start: 2022-02-03 | End: 2022-02-06

## 2022-02-03 RX ORDER — HEPARIN SODIUM 5000 [USP'U]/ML
5000 INJECTION INTRAVENOUS; SUBCUTANEOUS EVERY 12 HOURS
Refills: 0 | Status: DISCONTINUED | OUTPATIENT
Start: 2022-02-03 | End: 2022-02-06

## 2022-02-03 RX ORDER — SODIUM CHLORIDE 9 MG/ML
1000 INJECTION, SOLUTION INTRAVENOUS
Refills: 0 | Status: DISCONTINUED | OUTPATIENT
Start: 2022-02-03 | End: 2022-02-04

## 2022-02-03 RX ORDER — CITRIC ACID/SODIUM CITRATE 300-500 MG
30 SOLUTION, ORAL ORAL ONCE
Refills: 0 | Status: COMPLETED | OUTPATIENT
Start: 2022-02-03 | End: 2022-02-03

## 2022-02-03 RX ORDER — ACETAMINOPHEN 500 MG
975 TABLET ORAL
Refills: 0 | Status: DISCONTINUED | OUTPATIENT
Start: 2022-02-03 | End: 2022-02-06

## 2022-02-03 RX ORDER — KETOROLAC TROMETHAMINE 30 MG/ML
30 SYRINGE (ML) INJECTION EVERY 6 HOURS
Refills: 0 | Status: DISCONTINUED | OUTPATIENT
Start: 2022-02-03 | End: 2022-02-04

## 2022-02-03 RX ORDER — OXYCODONE HYDROCHLORIDE 5 MG/1
5 TABLET ORAL
Refills: 0 | Status: COMPLETED | OUTPATIENT
Start: 2022-02-03 | End: 2022-02-10

## 2022-02-03 RX ORDER — DEXAMETHASONE 0.5 MG/5ML
4 ELIXIR ORAL EVERY 6 HOURS
Refills: 0 | Status: DISCONTINUED | OUTPATIENT
Start: 2022-02-03 | End: 2022-02-04

## 2022-02-03 RX ORDER — MAGNESIUM HYDROXIDE 400 MG/1
30 TABLET, CHEWABLE ORAL
Refills: 0 | Status: DISCONTINUED | OUTPATIENT
Start: 2022-02-03 | End: 2022-02-06

## 2022-02-03 RX ORDER — ONDANSETRON 8 MG/1
4 TABLET, FILM COATED ORAL EVERY 6 HOURS
Refills: 0 | Status: DISCONTINUED | OUTPATIENT
Start: 2022-02-03 | End: 2022-02-04

## 2022-02-03 RX ORDER — BUPIVACAINE 13.3 MG/ML
30 INJECTION, SUSPENSION, LIPOSOMAL INFILTRATION ONCE
Refills: 0 | Status: DISCONTINUED | OUTPATIENT
Start: 2022-02-03 | End: 2022-02-04

## 2022-02-03 RX ORDER — NALOXONE HYDROCHLORIDE 4 MG/.1ML
0.1 SPRAY NASAL
Refills: 0 | Status: DISCONTINUED | OUTPATIENT
Start: 2022-02-03 | End: 2022-02-04

## 2022-02-03 RX ORDER — FAMOTIDINE 10 MG/ML
20 INJECTION INTRAVENOUS ONCE
Refills: 0 | Status: COMPLETED | OUTPATIENT
Start: 2022-02-03 | End: 2022-02-03

## 2022-02-03 RX ORDER — SIMETHICONE 80 MG/1
80 TABLET, CHEWABLE ORAL EVERY 4 HOURS
Refills: 0 | Status: DISCONTINUED | OUTPATIENT
Start: 2022-02-03 | End: 2022-02-05

## 2022-02-03 RX ORDER — OXYCODONE HYDROCHLORIDE 5 MG/1
5 TABLET ORAL ONCE
Refills: 0 | Status: DISCONTINUED | OUTPATIENT
Start: 2022-02-03 | End: 2022-02-06

## 2022-02-03 RX ORDER — ACETAMINOPHEN 500 MG
1000 TABLET ORAL ONCE
Refills: 0 | Status: COMPLETED | OUTPATIENT
Start: 2022-02-03 | End: 2022-02-03

## 2022-02-03 RX ORDER — IBUPROFEN 200 MG
600 TABLET ORAL EVERY 6 HOURS
Refills: 0 | Status: COMPLETED | OUTPATIENT
Start: 2022-02-03 | End: 2023-01-02

## 2022-02-03 RX ORDER — TETANUS TOXOID, REDUCED DIPHTHERIA TOXOID AND ACELLULAR PERTUSSIS VACCINE, ADSORBED 5; 2.5; 8; 8; 2.5 [IU]/.5ML; [IU]/.5ML; UG/.5ML; UG/.5ML; UG/.5ML
0.5 SUSPENSION INTRAMUSCULAR ONCE
Refills: 0 | Status: DISCONTINUED | OUTPATIENT
Start: 2022-02-03 | End: 2022-02-06

## 2022-02-03 RX ORDER — SODIUM CHLORIDE 9 MG/ML
1000 INJECTION, SOLUTION INTRAVENOUS ONCE
Refills: 0 | Status: COMPLETED | OUTPATIENT
Start: 2022-02-03 | End: 2022-02-03

## 2022-02-03 RX ORDER — MORPHINE SULFATE 50 MG/1
0.1 CAPSULE, EXTENDED RELEASE ORAL ONCE
Refills: 0 | Status: DISCONTINUED | OUTPATIENT
Start: 2022-02-03 | End: 2022-02-04

## 2022-02-03 RX ADMIN — Medication 1000 MILLIUNIT(S)/MIN: at 10:20

## 2022-02-03 RX ADMIN — HEPARIN SODIUM 5000 UNIT(S): 5000 INJECTION INTRAVENOUS; SUBCUTANEOUS at 17:39

## 2022-02-03 RX ADMIN — Medication 975 MILLIGRAM(S): at 21:52

## 2022-02-03 RX ADMIN — NALBUPHINE HYDROCHLORIDE 2.5 MILLIGRAM(S): 10 INJECTION, SOLUTION INTRAMUSCULAR; INTRAVENOUS; SUBCUTANEOUS at 14:44

## 2022-02-03 RX ADMIN — SODIUM CHLORIDE 75 MILLILITER(S): 9 INJECTION, SOLUTION INTRAVENOUS at 10:21

## 2022-02-03 RX ADMIN — Medication 30 MILLILITER(S): at 07:12

## 2022-02-03 RX ADMIN — Medication 400 MILLIGRAM(S): at 10:20

## 2022-02-03 RX ADMIN — Medication 30 MILLIGRAM(S): at 15:20

## 2022-02-03 RX ADMIN — NALBUPHINE HYDROCHLORIDE 2.5 MILLIGRAM(S): 10 INJECTION, SOLUTION INTRAMUSCULAR; INTRAVENOUS; SUBCUTANEOUS at 15:15

## 2022-02-03 RX ADMIN — SODIUM CHLORIDE 125 MILLILITER(S): 9 INJECTION, SOLUTION INTRAVENOUS at 07:12

## 2022-02-03 RX ADMIN — FAMOTIDINE 20 MILLIGRAM(S): 10 INJECTION INTRAVENOUS at 07:12

## 2022-02-03 RX ADMIN — SODIUM CHLORIDE 2000 MILLILITER(S): 9 INJECTION, SOLUTION INTRAVENOUS at 07:12

## 2022-02-03 RX ADMIN — Medication 975 MILLIGRAM(S): at 22:00

## 2022-02-03 RX ADMIN — Medication 1000 MILLIGRAM(S): at 10:45

## 2022-02-03 NOTE — CONSULT NOTE PEDS - SUBJECTIVE AND OBJECTIVE BOX
Patient is a 36 year old  @ 37.0 weeks, ARAMIS 2022, who presented to L&D for scheduled primary  secondary to history of transabdominal cerclage. Patient has history of 20 week loss vaginal delivery complicated by retained products, with D&C and blood transfusion. Patient admits to abdominal surgery with transabdominal cerclage placed via " scar". NPO TIME 10:30 pm last night. Patient admits to normal fetal movement. Denies vaginal bleeding, leakage of fluid, painful contractions/lower abdominal cramping, fever/chills, shortness of breath,  chest pain, increased swelling, difficulty ambulating, loss of taste/smell, nausea/vomiting/diarrhea, rash, weakness, paresthesia, change in appetite, dizziness, lightheadedness, cough, nasal congestion, runny nose. Mom is O +, HIV neg, HepB neg, RPR NR, Rubella IMM, GBS neg on . PMHx:  OBHx: , & eTOP with D&C, 2017: IVF pregnancy with PTL at 20.3 wks, vaginal delivery @ [facility], complicated by retained POCs requiring D&C and PRBC tx. 2018 IVF, complete  at 6 wks. GYNHx. small fibroids, endometriosis, PCOS. SurgicalHx: hysteroscopy 2017, 2018, dx laparascopy 2017, abd sx for transabdominal cerclage placement 2018, D&C X 3.   This pregnancy complicated by abnormal  fetal echo as noted above.           Requested by OB to consult with Mrs Smith, a 36 year old  @ 37.0 weeks, ARAMIS 2022, who presented to L&D for scheduled primary  secondary to history of transabdominal cerclage. Patient has history of 20 week loss vaginal delivery complicated by retained products, with D&C and blood transfusion. Patient admits to abdominal surgery with transabdominal cerclage placed via " scar". NPO TIME 10:30 pm last night. Patient admits to normal fetal movement. Denies vaginal bleeding, leakage of fluid, painful contractions/lower abdominal cramping, fever/chills, shortness of breath,  chest pain, increased swelling, difficulty ambulating, loss of taste/smell, nausea/vomiting/diarrhea, rash, weakness, paresthesia, change in appetite, dizziness, lightheadedness, cough, nasal congestion, runny nose. Mom is O +, HIV neg, HepB neg, RPR NR, Rubella IMM, GBS neg on . PMHx:  OBHx: , & eTOP with D&C, 2017: IVF pregnancy with PTL at 20.3 wks, vaginal delivery @ [facility], complicated by retained POCs requiring D&C and PRBC tx. 2018 IVF, complete  at 6 wks. GYNHx. small fibroids, endometriosis, PCOS. SurgicalHx: hysteroscopy 2017, 2018, dx laparascopy 2017, abd sx for transabdominal cerclage placement 2018, D&C X 3.   This pregnancy complicated by abnormal  fetal echo as noted above.   The following was discussed in detail w/ the mom at bedside.     1.	The NICU team will be present at delivery and will immediately assess and care for the infant.  2.	The infant may require respiratory support, most commonly in the form of nasal CPAP.   3.	Cardiology was notified of the infant's impeding delivery and will consult on the baby. Infant will have one and/or a series of echocardiograms  4.	Depending on the clinical status of the infant, enteral feedings may or may not be started immediately. The infant will receive IV nutrition as necessary.  5.	Discharge criteria and anticipated LOS.  Mom verbalized understanding of information provided and was given the opportunity to ask questions.   The total time spent on this consult was 30 minutes.  Thank you for this consult.

## 2022-02-03 NOTE — OB RN PATIENT PROFILE - ALERT: PERTINENT HISTORY
1st Trimester Sonogram/20 Week Level II Sonogram/Non Invasive Prenatal Screen (NIPS)/Ultra Screen at 12 Weeks/Other

## 2022-02-03 NOTE — OB PROVIDER H&P - NS_OBGYNHISTORY_OBGYN_ALL_OB_FT
OB History: : , eTOP D&C  G2: , eTOP D&C  G3: 10/3/2017, IVF pregnancy, @ 20.3 weeks PTL, vaginal delivery at [facility], complicated by retained POCs requiring D&C and 1 unit PRBC transfusion, without reaction  G4: , 6 weeks IVF complete   G5: Current pregnancy, IVF, s/p peds cardiology consult secondary to mild narrowing at isthmus cannot r/o coarctation of aorta

## 2022-02-03 NOTE — OB PROVIDER DELIVERY SUMMARY - NSPROVIDERDELIVERYNOTE_OBGYN_ALL_OB_FT
Procedure: pLTCS  Preop Dx: abdominal cerclage in place, fetal coarctation of aorta  QBL: 834ml  IVF:  2L crystalloids  UOP: 500ml  Layers of uterine closure: 2  Complications: none  Specimen: none   Findings: abdominal cerclage noted anteriorly & posteriorly, grossly normal uterus, BL fallopian tubes, BL ovaries  Hemostatic/intraoperative agents: surgicel powder  Baby: M infant, vertex, APGARs 9&9, 6#4  Luis M Georges, PGY2

## 2022-02-03 NOTE — OB PROVIDER DELIVERY SUMMARY - NSSELHIDDEN_OBGYN_ALL_OB_FT
[NS_DeliveryAttending1_OBGYN_ALL_OB_FT:[id number]==],[NS_DeliveryAssist1_OBGYN_ALL_OB_FT:[id number]],[NS_DeliveryRN_OBGYN_ALL_OB_FT:[id number]]

## 2022-02-03 NOTE — OB PROVIDER H&P - ASSESSMENT
36 year old  @ 37.0 weeks, IVF pregnancy, AMA, admitted to L&D for scheduled primary  secondary to history of transabdominal cerclage in place, with fetal alerts noted s/p peds cards consult, with history of 20 week loss complicated by requiring blood transfusion, Cat I tracing, vital signs stable  - Admit to L&D  - NPO  - IV access, CBC/T&C/RPR  - Pepcid and Bicitra as per pre-op policy  - T&C 2 units PRBCs, secondary to history of prior abdominal surgery and history of blood transfusion  - Anesthesia consult   - NICU aware  - Consent to be discussed and obtained by Dr. Fitch  - Plan to move to OR on time schedule  - Discussed with Dr. Fitch

## 2022-02-03 NOTE — OB RN PATIENT PROFILE - FALL HARM RISK - RISK INTERVENTIONS

## 2022-02-03 NOTE — OB RN INTRAOPERATIVE NOTE - NSSELHIDDEN_OBGYN_ALL_OB_FT
[NS_DeliveryAttending1_OBGYN_ALL_OB_FT:[id number]],[NS_DeliveryAssist1_OBGYN_ALL_OB_FT:[id number]],[NS_DeliveryRN_OBGYN_ALL_OB_FT:[id number]]

## 2022-02-03 NOTE — OB RN PATIENT PROFILE - NSICDXPASTSURGICALHX_GEN_ALL_CORE_FT
PAST SURGICAL HISTORY:  H/O cervical incompetence abdominal cerclage 2018    H/O laparoscopy 2017    History of hysteroscopy 2017 2018

## 2022-02-03 NOTE — OB PROVIDER H&P - NSHPSOCIALHISTORY_GEN_ALL_CORE
Patient denies tobacco/cigarette/alcohol/illicit drug use    Patient admits to depression after the fetal loss, denies anxiety, depression, or other mental health issues

## 2022-02-03 NOTE — OB PROVIDER H&P - HISTORY OF PRESENT ILLNESS
36 year old  @ 37.0 weeks, ARAMIS 2022, dated ETD5 consistent with 1st Trimester US, presents to L&D for scheduled primary  secondary to history of transabdominal cerclage. Patient has history of 20 week loss vaginal delivery complicated by retained products, with D&C and blood transfusion. Patient admits to abdominal surgery with transabdominal cerclage placed via " scar". NPO TIME 10:30 pm last night. Patient admits to normal fetal movement. Denies vaginal bleeding, leakage of fluid, painful contractions/lower abdominal cramping, fever/chills, shortness of breath,  chest pain, increased swelling, difficulty ambulating, loss of taste/smell, nausea/vomiting/diarrhea, rash, weakness, paresthesia, change in appetite, dizziness, lightheadedness, cough, nasal congestion, runny nose    OB History: : , eTOP D&C  G2: , eTOP D&C  G3: 10/3/2017, IVF pregnancy, @ 20.3 weeks PTL, vaginal delivery at [facility], complicated by retained POCs requiring D&C and 1 unit PRBC transfusion, without reaction  G4: , 6 weeks IVF complete   G5: Current pregnancy, IVF, s/p peds cardiology consult secondary to mild narrowing at isthmus cannot r/o coarctation of aorta    GYN Hx: Admits to history of small fibroids, endometriosis, PCOS, Denies STDs, abnormal pap smears     Med Hx: Denies asthma, HTN, DM, CAD, or other medical issues    Meds: PNV, denies other medications including prescribed/OTC     Allergies: NKDA, NKEA, NKFA    Surgical Hx: Hysteroscopy 2017, 2018, diagnostic laparoscopy 2017, abdominal surgery - transabdominal cerclage placement 2018, D&C x3     Vitals: ICU Vital Signs Last 24 Hrs  T(C): 36.9 (2022 06:30), Max: 36.9 (2022 06:30)  T(F): 98.4 (2022 06:30), Max: 98.4 (2022 06:30)  HR: 91 (2022 06:30) (91 - 91)  BP: 113/70 (2022 06:30) (113/70 - 113/70)  BP(mean): --  ABP: --  ABP(mean): --  RR: 14 (2022 06:30) (14 - 14)  SpO2: --    Gen: NAD, A+O x 3, resting comfortably  Resp: CTAB  Cardio: RRR  Abd: Gravid, soft, non-distended, non-tender to superficial and deep palpation in all 4 quadrants, no rebound/guarding  Ext: Warm, well perfused, 1+ nonpitting edema bilaterally    EFM: 135 bpm, moderate variability with spontaneous accelerations, no decelerations, Cat I tracing  [place]: Irritability and irregular contractions

## 2022-02-03 NOTE — CONSULT NOTE PEDS - CONSULT REASON
Maternal Fetal Alert, fetal echo showed mild narrowing of the isthmus, r/o coart. AMA, IVF , transabdominal cerclage in place   Fetal echo reveals mild narrowing at the isthmus , discrete coarctation of the aorta cannot be ruled out. Cardio consult 12/10/21  NICU admit with Peds Cardiology evaluation within a few hours after birth

## 2022-02-03 NOTE — OB RN PATIENT PROFILE - NSICDXPASTMEDICALHX_GEN_ALL_CORE_FT
PAST MEDICAL HISTORY:  Complete miscarriage 5 weeks gestation    COVID-19 1/14/22,  received Monolclona antibodies 1/16/22    H/O cervical incompetence     H/O fetal loss 21 weeks gestation    Herpes simplex never had lesions  per pt

## 2022-02-03 NOTE — OB NEONATOLOGY/PEDIATRICIAN DELIVERY SUMMARY - NSPEDSNEONOTESA_OBGYN_ALL_OB_FT
Baby boy born @ 37 weeks via scheduled C/S to a 35 y/o O+  mother.  Maternal hx TOP x2, D&C x2, 21 wk loss 2017, 2 week mis 2018, endometriosis, PCOS, pituitary adenoma, HSV1 on valtrex, anxiety/depression not on meds, hysteroscopy 2018, abdominal cerclage.  Prenatal hx fetal echo showing mild narrowing at isthmus, discrete coarctation of aorta, can't rule out.  Prenatal cardio consult on 12/10. Prenatal labs NR/immune/neg.  GBS neg on .  COVID positive on , vaccinated. AROM at time of delivery.  Baby emerged vigorous with good cry.  Delayed cord clamping for 45 seconds. CPAP  started at 4MOL due to poor respirations, max FiO2 30%. Oxygen saturations mid 80s. W/d/s/s with APGARs of 9/9.   Mom plans to breastfeed and consents hepB. Circ requested. Baby admitted to NICU for further management on CPAP .

## 2022-02-03 NOTE — OB PROVIDER H&P - NSHPLABSRESULTS_GEN_ALL_CORE
Prenatal Labs Reviewed:  O+  GBS Neg 1/26  GCT 90  RPR Neg  Rubella Immune  Hep B Neg  HIV Neg    Ultrasounds:  1/24: Cephalic presentation, anterior placenta, NORMA wnl, BPP 8/8, EFW 2614 g (38%ile)    S/p peds cards consult on 12/10

## 2022-02-03 NOTE — OB RN DELIVERY SUMMARY - NSSELHIDDEN_OBGYN_ALL_OB_FT
[NS_DeliveryAttending1_OBGYN_ALL_OB_FT:[id number]==],[NS_DeliveryAssist1_OBGYN_ALL_OB_FT:[id number]=],[NS_DeliveryRN_OBGYN_ALL_OB_FT:[id number]==]

## 2022-02-03 NOTE — OB RN PATIENT PROFILE - TRANSFUSION HX COMMENT, PROFILE
75 yo WM w mmp inc htn, constipation, bph, b12def, glaucoma, asthma, epilepsy and schizophrenia presents to ER for change in MS. Pt resident at [facility] and usually functions well and speaks clearly and logically at baseline. Per [facility]'s staff, pt found walking naked on sidewalk and muttering unintelligibly. This behavior not baseline for patient. Pt presents to ER vocalizing but sounds are not words. Sounds seem to have inflections and intonation as if pt attempting words and not just random sounds. Pt able to follow directions, move all 4 limbs equally and with strength, can follow multiple step command and count (able to count hand 5 times when instructed), when asked name attempts to say Buck but enunciation is very poor. Pt seems to be in good spirits, smiling and easy to engage and vocalize as if speaking to writer but words are not intelligible. Pt very alert, making good eye contact, no assymetry noted in face and pt moving all extremities. Pt has diagnosis of schizophrenia and is prescribed Haldol 5 mg po hs.     Due to above, pt not able to provide history and mental status exam limited.     Given patients advance age and pre-existing medical condition and above changes from baseline which seems to be acute, pt will undergo medical evaluation inc head imaging and r/o infection. Pt would accept blood transfusion if required

## 2022-02-03 NOTE — OB PROVIDER H&P - ATTENDING COMMENTS
I saw and evaluated Ms. Smith and agree with assessment and plan as above.   Admit for OR for  with abdominal cerclage in situ. No cerclage removal.   Franca Fitch MD

## 2022-02-03 NOTE — OB RN INTRAOPERATIVE NOTE - NS_DELIVERYNEONATOLOGIST2_OBGYN_ALL_OB_FT
Pt's wife called re: Lexapro. Pt tried and it and says it is not agreeing with him. He doesn't like the way it makes him feel. He would like to go back on Prozac. He is out and has no refills. He would like refill Prozac to be sent in.    Joel THAYER

## 2022-02-04 LAB
BASOPHILS # BLD AUTO: 0.03 K/UL — SIGNIFICANT CHANGE UP (ref 0–0.2)
BASOPHILS NFR BLD AUTO: 0.3 % — SIGNIFICANT CHANGE UP (ref 0–2)
EOSINOPHIL # BLD AUTO: 0.04 K/UL — SIGNIFICANT CHANGE UP (ref 0–0.5)
EOSINOPHIL NFR BLD AUTO: 0.3 % — SIGNIFICANT CHANGE UP (ref 0–6)
HCT VFR BLD CALC: 27.8 % — LOW (ref 34.5–45)
HGB BLD-MCNC: 9.4 G/DL — LOW (ref 11.5–15.5)
IANC: 8.3 K/UL — SIGNIFICANT CHANGE UP (ref 1.5–8.5)
IMM GRANULOCYTES NFR BLD AUTO: 0.5 % — SIGNIFICANT CHANGE UP (ref 0–1.5)
LYMPHOCYTES # BLD AUTO: 19.5 % — SIGNIFICANT CHANGE UP (ref 13–44)
LYMPHOCYTES # BLD AUTO: 2.31 K/UL — SIGNIFICANT CHANGE UP (ref 1–3.3)
MCHC RBC-ENTMCNC: 32.5 PG — SIGNIFICANT CHANGE UP (ref 27–34)
MCHC RBC-ENTMCNC: 33.8 GM/DL — SIGNIFICANT CHANGE UP (ref 32–36)
MCV RBC AUTO: 96.2 FL — SIGNIFICANT CHANGE UP (ref 80–100)
MONOCYTES # BLD AUTO: 1.1 K/UL — HIGH (ref 0–0.9)
MONOCYTES NFR BLD AUTO: 9.3 % — SIGNIFICANT CHANGE UP (ref 2–14)
NEUTROPHILS # BLD AUTO: 8.3 K/UL — HIGH (ref 1.8–7.4)
NEUTROPHILS NFR BLD AUTO: 70.1 % — SIGNIFICANT CHANGE UP (ref 43–77)
NRBC # BLD: 0 /100 WBCS — SIGNIFICANT CHANGE UP
NRBC # FLD: 0 K/UL — SIGNIFICANT CHANGE UP
PLATELET # BLD AUTO: 217 K/UL — SIGNIFICANT CHANGE UP (ref 150–400)
RBC # BLD: 2.89 M/UL — LOW (ref 3.8–5.2)
RBC # FLD: 13.3 % — SIGNIFICANT CHANGE UP (ref 10.3–14.5)
WBC # BLD: 11.84 K/UL — HIGH (ref 3.8–10.5)
WBC # FLD AUTO: 11.84 K/UL — HIGH (ref 3.8–10.5)

## 2022-02-04 RX ORDER — DIPHENHYDRAMINE HCL 50 MG
25 CAPSULE ORAL ONCE
Refills: 0 | Status: COMPLETED | OUTPATIENT
Start: 2022-02-04 | End: 2022-02-04

## 2022-02-04 RX ORDER — FERROUS SULFATE 325(65) MG
325 TABLET ORAL DAILY
Refills: 0 | Status: DISCONTINUED | OUTPATIENT
Start: 2022-02-04 | End: 2022-02-06

## 2022-02-04 RX ORDER — ASCORBIC ACID 60 MG
500 TABLET,CHEWABLE ORAL DAILY
Refills: 0 | Status: DISCONTINUED | OUTPATIENT
Start: 2022-02-04 | End: 2022-02-06

## 2022-02-04 RX ORDER — OXYCODONE HYDROCHLORIDE 5 MG/1
5 TABLET ORAL
Refills: 0 | Status: DISCONTINUED | OUTPATIENT
Start: 2022-02-04 | End: 2022-02-06

## 2022-02-04 RX ORDER — IBUPROFEN 200 MG
600 TABLET ORAL EVERY 6 HOURS
Refills: 0 | Status: DISCONTINUED | OUTPATIENT
Start: 2022-02-04 | End: 2022-02-06

## 2022-02-04 RX ADMIN — Medication 975 MILLIGRAM(S): at 17:28

## 2022-02-04 RX ADMIN — Medication 30 MILLIGRAM(S): at 01:00

## 2022-02-04 RX ADMIN — Medication 975 MILLIGRAM(S): at 21:46

## 2022-02-04 RX ADMIN — Medication 500 MILLIGRAM(S): at 13:01

## 2022-02-04 RX ADMIN — Medication 600 MILLIGRAM(S): at 13:34

## 2022-02-04 RX ADMIN — Medication 975 MILLIGRAM(S): at 21:16

## 2022-02-04 RX ADMIN — Medication 30 MILLIGRAM(S): at 00:04

## 2022-02-04 RX ADMIN — OXYCODONE HYDROCHLORIDE 5 MILLIGRAM(S): 5 TABLET ORAL at 13:34

## 2022-02-04 RX ADMIN — Medication 975 MILLIGRAM(S): at 16:47

## 2022-02-04 RX ADMIN — HEPARIN SODIUM 5000 UNIT(S): 5000 INJECTION INTRAVENOUS; SUBCUTANEOUS at 05:55

## 2022-02-04 RX ADMIN — Medication 600 MILLIGRAM(S): at 05:55

## 2022-02-04 RX ADMIN — Medication 600 MILLIGRAM(S): at 19:18

## 2022-02-04 RX ADMIN — Medication 325 MILLIGRAM(S): at 13:01

## 2022-02-04 RX ADMIN — OXYCODONE HYDROCHLORIDE 5 MILLIGRAM(S): 5 TABLET ORAL at 13:00

## 2022-02-04 RX ADMIN — Medication 600 MILLIGRAM(S): at 13:01

## 2022-02-04 RX ADMIN — OXYCODONE HYDROCHLORIDE 5 MILLIGRAM(S): 5 TABLET ORAL at 17:28

## 2022-02-04 RX ADMIN — Medication 975 MILLIGRAM(S): at 08:31

## 2022-02-04 RX ADMIN — OXYCODONE HYDROCHLORIDE 5 MILLIGRAM(S): 5 TABLET ORAL at 16:47

## 2022-02-04 RX ADMIN — Medication 25 MILLIGRAM(S): at 00:29

## 2022-02-04 RX ADMIN — HEPARIN SODIUM 5000 UNIT(S): 5000 INJECTION INTRAVENOUS; SUBCUTANEOUS at 19:18

## 2022-02-04 NOTE — PROGRESS NOTE ADULT - ASSESSMENT
A/P: 37yo POD#1 s/p LTCS.  Patient is stable and doing well post-operatively.    - Continue regular diet. Monitor for flatus.   - Increase ambulation.  - Continue motrin, tylenol, oxycodone PRN for pain control  - AM CBC as above.     SOTERO Thompson MD  PGY-1

## 2022-02-04 NOTE — PROGRESS NOTE ADULT - SUBJECTIVE AND OBJECTIVE BOX
S: 37yo POD#1 s/p LTCS . Her pain is well controlled. She is tolerating a regular diet, but is not yet passing flatus. Denies N/V. Denies CP/SOB/lightheadedness/dizziness.  She is ambulating without difficulty.  Voiding spontaneously     O:   Vital Signs Last 24 Hrs  T(C): 36.7 (04 Feb 2022 08:49), Max: 36.9 (03 Feb 2022 21:36)  T(F): 98.1 (04 Feb 2022 08:49), Max: 98.4 (03 Feb 2022 21:36)  HR: 67 (04 Feb 2022 08:49) (62 - 73)  BP: 100/55 (04 Feb 2022 08:49) (94/57 - 107/66)  BP(mean): 69 (03 Feb 2022 14:00) (67 - 76)  RR: 18 (04 Feb 2022 08:49) (15 - 20)  SpO2: 97% (04 Feb 2022 08:49) (97% - 100%)    Labs:  Blood type: O Positive  Rubella IgG: RPR: Negative                          9.4<L>   11.84<H> >-----------< 217    ( 02-04 @ 06:06 )             27.8<L>                        11.4<L>   7.52 >-----------< 240    ( 02-03 @ 06:26 )             34.3<L>        acetaminophen     Tablet .. 975 milliGRAM(s) Oral <User Schedule>  ascorbic acid 500 milliGRAM(s) Oral daily  diphenhydrAMINE 25 milliGRAM(s) Oral every 6 hours PRN  diphtheria/tetanus/pertussis (acellular) Vaccine (ADAcel) 0.5 milliLiter(s) IntraMuscular once  ferrous    sulfate 325 milliGRAM(s) Oral daily  heparin   Injectable 5000 Unit(s) SubCutaneous every 12 hours  ibuprofen  Tablet. 600 milliGRAM(s) Oral every 6 hours  lanolin Ointment 1 Application(s) Topical every 6 hours PRN  magnesium hydroxide Suspension 30 milliLiter(s) Oral two times a day PRN  oxyCODONE    IR 5 milliGRAM(s) Oral every 3 hours PRN  oxyCODONE    IR 5 milliGRAM(s) Oral once PRN  simethicone 80 milliGRAM(s) Chew every 4 hours PRN      PE:  General: NAD  Abdomen: Mildly distended, appropriately tender, incision c/d/i.  Extremities: No erythema, no pitting edema

## 2022-02-05 ENCOUNTER — TRANSCRIPTION ENCOUNTER (OUTPATIENT)
Age: 37
End: 2022-02-05

## 2022-02-05 RX ORDER — OXYCODONE HYDROCHLORIDE 5 MG/1
1 TABLET ORAL
Qty: 18 | Refills: 0
Start: 2022-02-05 | End: 2022-02-07

## 2022-02-05 RX ORDER — SIMETHICONE 80 MG/1
80 TABLET, CHEWABLE ORAL EVERY 6 HOURS
Refills: 0 | Status: DISCONTINUED | OUTPATIENT
Start: 2022-02-05 | End: 2022-02-06

## 2022-02-05 RX ORDER — FERROUS SULFATE 325(65) MG
1 TABLET ORAL
Qty: 0 | Refills: 0 | DISCHARGE
Start: 2022-02-05

## 2022-02-05 RX ORDER — LANOLIN
1 OINTMENT (GRAM) TOPICAL
Qty: 0 | Refills: 0 | DISCHARGE
Start: 2022-02-05

## 2022-02-05 RX ORDER — MAGNESIUM HYDROXIDE 400 MG/1
30 TABLET, CHEWABLE ORAL
Qty: 0 | Refills: 0 | DISCHARGE
Start: 2022-02-05

## 2022-02-05 RX ORDER — ASPIRIN/CALCIUM CARB/MAGNESIUM 324 MG
1 TABLET ORAL
Qty: 0 | Refills: 0 | DISCHARGE

## 2022-02-05 RX ORDER — ACETAMINOPHEN 500 MG
3 TABLET ORAL
Qty: 0 | Refills: 0 | DISCHARGE
Start: 2022-02-05

## 2022-02-05 RX ORDER — SIMETHICONE 80 MG/1
1 TABLET, CHEWABLE ORAL
Qty: 0 | Refills: 0 | DISCHARGE
Start: 2022-02-05

## 2022-02-05 RX ORDER — IBUPROFEN 200 MG
1 TABLET ORAL
Qty: 0 | Refills: 0 | DISCHARGE
Start: 2022-02-05 | End: 2022-02-12

## 2022-02-05 RX ADMIN — Medication 975 MILLIGRAM(S): at 23:43

## 2022-02-05 RX ADMIN — Medication 600 MILLIGRAM(S): at 00:53

## 2022-02-05 RX ADMIN — OXYCODONE HYDROCHLORIDE 5 MILLIGRAM(S): 5 TABLET ORAL at 10:29

## 2022-02-05 RX ADMIN — Medication 600 MILLIGRAM(S): at 20:16

## 2022-02-05 RX ADMIN — OXYCODONE HYDROCHLORIDE 5 MILLIGRAM(S): 5 TABLET ORAL at 17:33

## 2022-02-05 RX ADMIN — Medication 600 MILLIGRAM(S): at 20:46

## 2022-02-05 RX ADMIN — Medication 975 MILLIGRAM(S): at 03:57

## 2022-02-05 RX ADMIN — OXYCODONE HYDROCHLORIDE 5 MILLIGRAM(S): 5 TABLET ORAL at 18:15

## 2022-02-05 RX ADMIN — Medication 975 MILLIGRAM(S): at 17:34

## 2022-02-05 RX ADMIN — Medication 600 MILLIGRAM(S): at 13:15

## 2022-02-05 RX ADMIN — SIMETHICONE 80 MILLIGRAM(S): 80 TABLET, CHEWABLE ORAL at 23:13

## 2022-02-05 RX ADMIN — SIMETHICONE 80 MILLIGRAM(S): 80 TABLET, CHEWABLE ORAL at 17:33

## 2022-02-05 RX ADMIN — HEPARIN SODIUM 5000 UNIT(S): 5000 INJECTION INTRAVENOUS; SUBCUTANEOUS at 06:12

## 2022-02-05 RX ADMIN — MAGNESIUM HYDROXIDE 30 MILLILITER(S): 400 TABLET, CHEWABLE ORAL at 09:57

## 2022-02-05 RX ADMIN — Medication 600 MILLIGRAM(S): at 14:00

## 2022-02-05 RX ADMIN — OXYCODONE HYDROCHLORIDE 5 MILLIGRAM(S): 5 TABLET ORAL at 11:16

## 2022-02-05 RX ADMIN — Medication 975 MILLIGRAM(S): at 09:57

## 2022-02-05 RX ADMIN — Medication 500 MILLIGRAM(S): at 17:33

## 2022-02-05 RX ADMIN — Medication 975 MILLIGRAM(S): at 18:15

## 2022-02-05 RX ADMIN — Medication 975 MILLIGRAM(S): at 23:13

## 2022-02-05 RX ADMIN — Medication 325 MILLIGRAM(S): at 17:33

## 2022-02-05 RX ADMIN — HEPARIN SODIUM 5000 UNIT(S): 5000 INJECTION INTRAVENOUS; SUBCUTANEOUS at 17:33

## 2022-02-05 RX ADMIN — Medication 975 MILLIGRAM(S): at 04:27

## 2022-02-05 RX ADMIN — MAGNESIUM HYDROXIDE 30 MILLILITER(S): 400 TABLET, CHEWABLE ORAL at 17:40

## 2022-02-05 RX ADMIN — SIMETHICONE 80 MILLIGRAM(S): 80 TABLET, CHEWABLE ORAL at 09:57

## 2022-02-05 RX ADMIN — Medication 975 MILLIGRAM(S): at 10:27

## 2022-02-05 RX ADMIN — Medication 600 MILLIGRAM(S): at 06:12

## 2022-02-05 RX ADMIN — Medication 600 MILLIGRAM(S): at 01:23

## 2022-02-05 NOTE — DISCHARGE NOTE OB - CARE PLAN
1 Principal Discharge DX:	 delivery delivered  Assessment and plan of treatment:	Take tylenol, motrin, and oxycodone as needed for postpartum pain. Oxycodone prescription was called to the [facility] pharmacy in the [facility].   Take Colace daily as a stool softener when you are taking oxycodone.   Follow up with Dr. Teresa Brunson in 2 weeks.   Call for: fever, foul smelling or green discharge, heavy vaginal bleeding, nausea or vomiting, dizziness, severe headaches, abdominal pain, or tender, red breasts.

## 2022-02-05 NOTE — PROGRESS NOTE ADULT - SUBJECTIVE AND OBJECTIVE BOX
POST OP DAY  2  s/p   SECTION    SUBJECTIVE:  I'm ok.    PAIN SCALE SCORE: [x] Refer to charted pain scores    THERAPY:  [ x ] Spinal morphine   [  ] Epidural morphine   [  ] IV PCA Hydromorphone 1 mg/ml    OBJECTIVE:  Comfortable Appearing    SEDATION SCORE:	  [ x ] Alert	    [  ] Drowsy        [  ] Arousable	[  ] Asleep	[  ] Unresponsive    Side Effects:	  [ x ] None	     [  ] Nausea        [  ] Pruritus        [  ] Weakness   [  ] Numbness        ASSESSMENT/ PLAN   [   ] Discontinue         [  ] Continue    [ x ] Change to prn Analgesics as per primary service.    DOCUMENTATION & VERIFICATION OF CURRENT MEDS [ x ] Done    COMMENTS: No Headache.  Seen POD 2, was in NICU on POD 1 when intial attempt at eval occurred.

## 2022-02-05 NOTE — DISCHARGE NOTE OB - WILL THE PATIENT ACCEPT THE PFIZER COVID-19 VACCINE IF ELIGIBLE AND IT IS AVAILABLE?
----- Message from Bryant Peck 
----- Message from Chris Conner sent at 2/14/2018  2:56 PM CST -----  Regarding: Pt Requesting Call, Symptoms  Contact: [phone]  Pt's #: [phone]  Pt of Dr. Gamboa    Pt called in to speak with you regarding some worsening back pain and leg pain. She schedule an appt on 3/13 with Dr. Gamboa, but she was looking for sooner. I offered another provider in the clinic but she declined. Pt is requesting a call.    Message left for pt to call back.  Jackie Courtney RN 4:18 PM on 2/14/2018.    
No

## 2022-02-05 NOTE — DISCHARGE NOTE OB - PLAN OF CARE
Take tylenol, motrin, and oxycodone as needed for postpartum pain. Oxycodone prescription was called to the [facility] in the [facility].   Take Colace daily as a stool softener when you are taking oxycodone.   Follow up with Dr. Teresa Brunson in 2 weeks.   Call for: fever, foul smelling or green discharge, heavy vaginal bleeding, nausea or vomiting, dizziness, severe headaches, abdominal pain, or tender, red breasts.

## 2022-02-05 NOTE — DISCHARGE NOTE OB - HOSPITAL COURSE
Uncomplicated  delivery. Abdominal cerclage left in situ.   Met all postoperative milestones.   Discharged on postoperative day 2 in stable condition with close interval follow up.

## 2022-02-05 NOTE — DISCHARGE NOTE OB - NS MD DC FALL RISK RISK
For information on Fall & Injury Prevention, visit: [email or web address] OR  [email or web address] OR  https://www.cdc.gov/steadi/patient.html

## 2022-02-05 NOTE — DISCHARGE NOTE OB - MEDICATION SUMMARY - MEDICATIONS TO TAKE
2018
I will START or STAY ON the medications listed below when I get home from the hospital:    Pre nichole vitamins  -- 1 tab(s) by mouth once a day  -- Indication: For Postpartum    acetaminophen 325 mg oral tablet  -- 3 tab(s) by mouth every 8 hours, As Needed - for moderate pain  -- Indication: For Postpartum pain    ibuprofen 600 mg oral tablet  -- 1 tab(s) by mouth every 6 hours, As Needed - for moderate pain  -- Indication: For Postpartum pain    Oxaydo 5 mg oral tablet  -- 1 tab(s) by mouth every 4 to 8 hours, As Needed -Moderate to Severe Pain (4-10) MDD:3 tabs  -- Indication: For Postpartum pain    magnesium hydroxide 8% oral suspension  -- 30 milliliter(s) by mouth 2 times a day, As needed, Constipation  -- Indication: For Postpartum constipation     valACYclovir 500 mg oral tablet  -- 1 tab(s) by mouth once a day  -- Indication: For Herpes simplex    lanolin topical ointment  -- 1 application on skin every 6 hours, As needed, Sore Nipples  -- Indication: For Postpartum     ferrous sulfate 325 mg (65 mg elemental iron) oral tablet  -- 1 tab(s) by mouth once a day  -- Indication: For Anemia after surgery    simethicone 80 mg oral tablet, chewable  -- 1 tab(s) by mouth every 4 hours, As needed, Gas  -- Indication: For Gas pain

## 2022-02-05 NOTE — DISCHARGE NOTE OB - MATERIALS PROVIDED
Vaccinations/  Immunization Record/Breastfeeding Log/Guide to Postpartum Care/[facility] Hearing Screen Program/Shaken Baby Prevention Handout/Breastfeeding Guide and Packet/Birth Certificate Instructions/Tdap Vaccination (VIS Pub Date: 2012)

## 2022-02-05 NOTE — PROGRESS NOTE ADULT - ATTENDING COMMENTS
I saw and evaluated Ms. Smith and agree with above.   Standing simethicone for gas pain.   Encouraged ambulation.   Appreciate ACP note.   Circumcision performed.     Dispo planning to home tomorrow.     All questions answered.   Franca Fitch MD
35 y/o s/p pLTCS for h/o abdominal cerclage, now POD#1.  Doing well. Denies fever, chills, nausea or vomiting.  She is ambulating with assistance, voiding spontaneously.  Pain is well controlled. Tolerating food/drink.  Denies passing gas and has not yet had a bowel movement.  Incision clean, dry and intact, non erythematous. Uterus firm below umbilicus. No calf tenderness bilaterally.  Encouraged ambulation. Continue routine post op care. Patient requesting circumcision for baby.    Adam Gastelum MD

## 2022-02-05 NOTE — DISCHARGE NOTE OB - CARE PROVIDER_API CALL
Teresa Brunson)  Obstetrics and Gynecology  [address]  [place], [place] [address]  Phone: [phone]  Fax: [phone]  Follow Up Time:

## 2022-02-05 NOTE — PROGRESS NOTE ADULT - SUBJECTIVE AND OBJECTIVE BOX
SUBJECTIVE:  Pain: well controlled  Complaints: none  MILESTONES:  Alert and oriented x 3  [ x ]  Out of bed/ ambulating. [ x ]  Flatus: [x  ]  Postive [  ] Negative   Bowel movement  [  ] Positive [x  ] Negative   Voiding [ x ] Due to void [  ]   Diet: Regular [x  ]  Clears [  ]  NPO [  ]  Infant feeding:  Breast [ x ]   Bottle [  ]  Both [  ]  Feeding related inssues and/or concerns: none    OBJECTIVE:  T(C): 36.9 (22 @ 05:32), Max: 36.9 (22 @ 21:28)  HR: 70 (22 @ 05:32) (68 - 78)  BP: 99/57 (22 @ 05:32) (96/54 - 99/57)  RR: 16 (22 @ 05:32) (16 - 18)  SpO2: 99% (22 @ 05:32) (99% - 100%)  Wt(kg): --                        9.4    11.84 )-----------( 217      ( 2022 06:06 )             27.8       MEDICATIONS  (STANDING):  acetaminophen     Tablet .. 975 milliGRAM(s) Oral <User Schedule>  ascorbic acid 500 milliGRAM(s) Oral daily  diphtheria/tetanus/pertussis (acellular) Vaccine (ADAcel) 0.5 milliLiter(s) IntraMuscular once  ferrous    sulfate 325 milliGRAM(s) Oral daily  heparin   Injectable 5000 Unit(s) SubCutaneous every 12 hours  ibuprofen  Tablet. 600 milliGRAM(s) Oral every 6 hours    MEDICATIONS  (PRN):  diphenhydrAMINE 25 milliGRAM(s) Oral every 6 hours PRN Pruritus  lanolin Ointment 1 Application(s) Topical every 6 hours PRN Sore Nipples  magnesium hydroxide Suspension 30 milliLiter(s) Oral two times a day PRN Constipation  oxyCODONE    IR 5 milliGRAM(s) Oral every 3 hours PRN Moderate to Severe Pain (4-10)  oxyCODONE    IR 5 milliGRAM(s) Oral once PRN Moderate to Severe Pain (4-10)  simethicone 80 milliGRAM(s) Chew every 4 hours PRN Gas      ASSESSMENT:  36y  y/o G 5  P 2   PO Day#  2      Delivery: Primary [ x ]    Repeat [  ]                                       Indication of procedure: h/o trans abdominal cerclage  Condition: Stable  Past Medical History significant for: HPI:  36 year old  @ 37.0 weeks, ARAMIS 2022, dated ETD5 consistent with 1st Trimester US, presents to L&D for scheduled primary  secondary to history of transabdominal cerclage. Patient has history of 20 week loss vaginal delivery complicated by retained products, with D&C and blood transfusion. Patient admits to abdominal surgery with transabdominal cerclage placed via " scar". NPO TIME 10:30 pm last night. Patient admits to normal fetal movement. Denies vaginal bleeding, leakage of fluid, painful contractions/lower abdominal cramping, fever/chills, shortness of breath,  chest pain, increased swelling, difficulty ambulating, loss of taste/smell, nausea/vomiting/diarrhea, rash, weakness, paresthesia, change in appetite, dizziness, lightheadedness, cough, nasal congestion, runny nose  OB History: : , eTOP D&C  G2: , eTOP D&C  G3: 10/3/2017, IVF pregnancy, @ 20.3 weeks PTL, vaginal delivery at [facility], complicated by retained POCs requiring D&C and 1 unit PRBC transfusion, without reaction  G4: , 6 weeks IVF complete   G5: Current pregnancy, IVF, s/p peds cardiology consult secondary to mild narrowing at isthmus cannot r/o coarctation of aorta  GYN Hx: Admits to history of small fibroids, endometriosis, PCOS, Denies STDs, abnormal pap smears   Med Hx: Denies asthma, HTN, DM, CAD, or other medical issues  Meds: PNV, denies other medications including prescribed/OTC   Allergies: NKDA, NKEA, NKFA  Surgical Hx: Hysteroscopy 2017, 2018, diagnostic laparoscopy 2017, abdominal surgery - transabdominal cerclage placement , D&C x3   Vitals: ICU Vital Signs Last 24 Hrs  T(C): 36.9 (2022 06:30), Max: 36.9 (2022 06:30)  T(F): 98.4 (2022 06:30), Max: 98.4 (2022 06:30)  HR: 91 (2022 06:30) (91 - 91)  BP: 113/70 (2022 06:30) (113/70 - 113/70)  BP(mean): --  ABP: --  ABP(mean): --  RR: 14 (2022 06:30) (14 - 14)  SpO2: --  Gen: NAD, A+O x 3, resting comfortably  Resp: CTAB  Cardio: RRR  Abd: Gravid, soft, non-distended, non-tender to superficial and deep palpation in all 4 quadrants, no rebound/guarding  Ext: Warm, well perfused, 1+ nonpitting edema bilaterally  EFM: 135 bpm, moderate variability with spontaneous accelerations, no decelerations, Cat I tracing  [place]: Irritability and irregular contractions    (2022 06:44)    Current Issues: none  Heart:        RRR                      Lungs: clear  Breasts:  Soft [x  ]   Engorged [  ]  Abdomen: Soft [ x ] , distended [  ] nontender [ x ]   Bowel sounds :  Present [ x ]  Absent [  ]   Fundus firm [ x ]  Boggy [  ]  Abdominal incision: Clean, dry and intact [ x ]  Staples [  ] Steri Strips [  ] Dermabond [  ] Sutures [x  ] DON ( )  Patient wearing abdominal binder for support.  Vaginal: Lochia:  Heavy [  ]  Moderate [  ]   Scant [ x ]  Extremities: Edema [ 0 ] negative Anmol's Sign [ x ] Nontender Angel  [ x ] Positive pedal pulses [ x ]  Other relevant physical exam findings: none      PLAN:  Plan: Increase ambulation, analgesia PRN and pain medication protocol standing oxycodone, ibuprofen and acetaminophen.  Diet: Regular diet  Continue routine post-operative and postpartum care.   Discharge Planning [  ]  Consults:   Social Work [  ] Lacation [  ] Other [  ]    SUBJECTIVE:  Pain: well controlled  Complaints: none  MILESTONES:  Alert and oriented x 3  [ x ]  Out of bed/ ambulating. [ x ]  Flatus: [x  ]  Postive [  ] Negative   Bowel movement  [  ] Positive [x  ] Negative   Voiding [ x ] Due to void [  ]   Diet: Regular [x  ]  Clears [  ]  NPO [  ]  Infant feeding:  Breast [ x ]   Bottle [  ]  Both [  ]  Feeding related inssues and/or concerns: none    OBJECTIVE:  T(C): 36.9 (22 @ 05:32), Max: 36.9 (22 @ 21:28)  HR: 70 (22 @ 05:32) (68 - 78)  BP: 99/57 (22 @ 05:32) (96/54 - 99/57)  RR: 16 (22 @ 05:32) (16 - 18)  SpO2: 99% (22 @ 05:32) (99% - 100%)  Wt(kg): --                        9.4    11.84 )-----------( 217      ( 2022 06:06 )             27.8       MEDICATIONS  (STANDING):  acetaminophen     Tablet .. 975 milliGRAM(s) Oral <User Schedule>  ascorbic acid 500 milliGRAM(s) Oral daily  diphtheria/tetanus/pertussis (acellular) Vaccine (ADAcel) 0.5 milliLiter(s) IntraMuscular once  ferrous    sulfate 325 milliGRAM(s) Oral daily  heparin   Injectable 5000 Unit(s) SubCutaneous every 12 hours  ibuprofen  Tablet. 600 milliGRAM(s) Oral every 6 hours    MEDICATIONS  (PRN):  diphenhydrAMINE 25 milliGRAM(s) Oral every 6 hours PRN Pruritus  lanolin Ointment 1 Application(s) Topical every 6 hours PRN Sore Nipples  magnesium hydroxide Suspension 30 milliLiter(s) Oral two times a day PRN Constipation  oxyCODONE    IR 5 milliGRAM(s) Oral every 3 hours PRN Moderate to Severe Pain (4-10)  oxyCODONE    IR 5 milliGRAM(s) Oral once PRN Moderate to Severe Pain (4-10)  simethicone 80 milliGRAM(s) Chew every 4 hours PRN Gas      ASSESSMENT:  36y  y/o G 5  P 2   PO Day#  2      Delivery: Primary [ x ]    Repeat [  ]                                       Indication of procedure: h/o trans abdominal cerclage  Condition: Stable  Past Medical History significant for: HPI:  36 year old  @ 37.0 weeks, ARAMIS 2022, dated ETD5 consistent with 1st Trimester US, presents to L&D for scheduled primary  secondary to history of transabdominal cerclage. Patient has history of 20 week loss vaginal delivery complicated by retained products, with D&C and blood transfusion. Patient admits to abdominal surgery with transabdominal cerclage placed via " scar". NPO TIME 10:30 pm last night. Patient admits to normal fetal movement. Denies vaginal bleeding, leakage of fluid, painful contractions/lower abdominal cramping, fever/chills, shortness of breath,  chest pain, increased swelling, difficulty ambulating, loss of taste/smell, nausea/vomiting/diarrhea, rash, weakness, paresthesia, change in appetite, dizziness, lightheadedness, cough, nasal congestion, runny nose  OB History: : , eTOP D&C  G2: , eTOP D&C  G3: 10/3/2017, IVF pregnancy, @ 20.3 weeks PTL, vaginal delivery at [facility], complicated by retained POCs requiring D&C and 1 unit PRBC transfusion, without reaction  G4: , 6 weeks IVF complete   G5: Current pregnancy, IVF, s/p peds cardiology consult secondary to mild narrowing at isthmus cannot r/o coarctation of aorta  GYN Hx: Admits to history of small fibroids, endometriosis, PCOS, Denies STDs, abnormal pap smears   Med Hx: Denies asthma, HTN, DM, CAD, or other medical issues  Meds: PNV, denies other medications including prescribed/OTC   Allergies: NKDA, NKEA, NKFA  Surgical Hx: Hysteroscopy 2017, 2018, diagnostic laparoscopy 2017, abdominal surgery - transabdominal cerclage placement , D&C x3   Vitals: ICU Vital Signs Last 24 Hrs  T(C): 36.9 (2022 06:30), Max: 36.9 (2022 06:30)  T(F): 98.4 (2022 06:30), Max: 98.4 (2022 06:30)  HR: 91 (2022 06:30) (91 - 91)  BP: 113/70 (2022 06:30) (113/70 - 113/70)  BP(mean): --  ABP: --  ABP(mean): --  RR: 14 (2022 06:30) (14 - 14)  SpO2: --  Gen: NAD, A+O x 3, resting comfortably  Resp: CTAB  Cardio: RRR  Abd: Gravid, soft, non-distended, non-tender to superficial and deep palpation in all 4 quadrants, no rebound/guarding  Ext: Warm, well perfused, 1+ nonpitting edema bilaterally  EFM: 135 bpm, moderate variability with spontaneous accelerations, no decelerations, Cat I tracing  [place]: Irritability and irregular contractions    (2022 06:44)    Current Issues: none  Heart:        RRR                      Lungs: clear  Breasts:  Soft [x  ]   Engorged [  ]  Abdomen: Soft [ x ] , distended [  ] nontender [ x ]   Bowel sounds :  Present [ x ]  Absent [  ]   Fundus firm [ x ]  Boggy [  ]  Abdominal incision: Clean, dry and intact [ x ]  Staples [  ] Steri Strips [  ] Dermabond [  ] Sutures [x  ] DON ( )  Patient wearing abdominal binder for support.  Vaginal: Lochia:  Heavy [  ]  Moderate [  ]   Scant [ x ]  Extremities: Edema [ 0 ] negative Anmol's Sign [ x ] Nontender Angel  [ x ] Positive pedal pulses [ x ]  Other relevant physical exam findings: none      PLAN:  Plan: Increase ambulation, analgesia PRN and pain medication protocol standing oxycodone, ibuprofen and acetaminophen.  Diet: Regular diet  Continue routine post-operative and postpartum care.   Discharge Planning [  ]  Consults:   Social Work [  ] Lacation [x] Other [  ]

## 2022-02-05 NOTE — DISCHARGE NOTE OB - PATIENT PORTAL LINK FT
You can access the FollowMyHealth Patient Portal offered by [facility] by registering at the following website: [email or web address]. By joining "[employer]’s FollowMyHealth portal, you will also be able to view your health information using other applications (apps) compatible with our system.

## 2022-02-05 NOTE — DISCHARGE NOTE OB - MEDICATION SUMMARY - MEDICATIONS TO STOP TAKING
I will STOP taking the medications listed below when I get home from the hospital:    aspirin 81 mg oral tablet  -- 1 tab(s) by mouth once a day    vaginal progesterone  -- 200 milligram(s) vaginally once a day    Iron  -- 1 tab(s) by mouth once a day

## 2022-02-06 VITALS
SYSTOLIC BLOOD PRESSURE: 110 MMHG | TEMPERATURE: 98 F | RESPIRATION RATE: 20 BRPM | OXYGEN SATURATION: 99 % | DIASTOLIC BLOOD PRESSURE: 66 MMHG | HEART RATE: 74 BPM

## 2022-02-06 RX ADMIN — Medication 600 MILLIGRAM(S): at 02:30

## 2022-02-06 RX ADMIN — OXYCODONE HYDROCHLORIDE 5 MILLIGRAM(S): 5 TABLET ORAL at 13:10

## 2022-02-06 RX ADMIN — Medication 975 MILLIGRAM(S): at 05:18

## 2022-02-06 RX ADMIN — Medication 600 MILLIGRAM(S): at 15:34

## 2022-02-06 RX ADMIN — Medication 600 MILLIGRAM(S): at 02:00

## 2022-02-06 RX ADMIN — Medication 600 MILLIGRAM(S): at 08:42

## 2022-02-06 RX ADMIN — OXYCODONE HYDROCHLORIDE 5 MILLIGRAM(S): 5 TABLET ORAL at 18:04

## 2022-02-06 RX ADMIN — Medication 975 MILLIGRAM(S): at 12:39

## 2022-02-06 RX ADMIN — OXYCODONE HYDROCHLORIDE 5 MILLIGRAM(S): 5 TABLET ORAL at 18:33

## 2022-02-06 RX ADMIN — OXYCODONE HYDROCHLORIDE 5 MILLIGRAM(S): 5 TABLET ORAL at 12:39

## 2022-02-06 RX ADMIN — Medication 975 MILLIGRAM(S): at 18:05

## 2022-02-06 RX ADMIN — Medication 600 MILLIGRAM(S): at 15:50

## 2022-02-06 RX ADMIN — MAGNESIUM HYDROXIDE 30 MILLILITER(S): 400 TABLET, CHEWABLE ORAL at 05:18

## 2022-02-06 RX ADMIN — OXYCODONE HYDROCHLORIDE 5 MILLIGRAM(S): 5 TABLET ORAL at 05:48

## 2022-02-06 RX ADMIN — Medication 325 MILLIGRAM(S): at 12:40

## 2022-02-06 RX ADMIN — Medication 975 MILLIGRAM(S): at 13:10

## 2022-02-06 RX ADMIN — Medication 975 MILLIGRAM(S): at 18:32

## 2022-02-06 RX ADMIN — Medication 975 MILLIGRAM(S): at 05:48

## 2022-02-06 RX ADMIN — OXYCODONE HYDROCHLORIDE 5 MILLIGRAM(S): 5 TABLET ORAL at 05:18

## 2022-02-06 RX ADMIN — HEPARIN SODIUM 5000 UNIT(S): 5000 INJECTION INTRAVENOUS; SUBCUTANEOUS at 05:18

## 2022-02-06 RX ADMIN — Medication 500 MILLIGRAM(S): at 12:40

## 2022-02-06 RX ADMIN — Medication 600 MILLIGRAM(S): at 09:34

## 2022-02-06 RX ADMIN — SIMETHICONE 80 MILLIGRAM(S): 80 TABLET, CHEWABLE ORAL at 05:18

## 2022-02-06 NOTE — PROGRESS NOTE ADULT - SUBJECTIVE AND OBJECTIVE BOX
SUBJECTIVE:  Pain: well controlled  Complaints: none  MILESTONES:  Alert and oriented x 3  [ x ]  Out of bed/ ambulating. [ x ]  Flatus: [x  ]  Postive [  ] Negative   Bowel movement  [ x ] Positive [  ] Negative   Voiding [x  ] Due to void [  ]   Diet: Regular [x  ]  Clears [  ]  NPO [  ]  Infant feeding:  Breast [x  ]   Bottle [  ]  Both [  ]  Feeding related inssues and/or concerns: none    OBJECTIVE:  T(C): 36.4 (22 @ 05:20), Max: 37 (22 @ 14:00)  HR: 70 (22 @ 05:20) (70 - 88)  BP: 100/64 (22 @ 05:20) (100/64 - 132/63)  RR: 17 (22 @ 05:20) (17 - 19)  SpO2: 100% (22 @ 05:20) (98% - 100%)  Wt(kg): --      MEDICATIONS  (STANDING):  acetaminophen     Tablet .. 975 milliGRAM(s) Oral <User Schedule>  ascorbic acid 500 milliGRAM(s) Oral daily  diphtheria/tetanus/pertussis (acellular) Vaccine (ADAcel) 0.5 milliLiter(s) IntraMuscular once  ferrous    sulfate 325 milliGRAM(s) Oral daily  heparin   Injectable 5000 Unit(s) SubCutaneous every 12 hours  ibuprofen  Tablet. 600 milliGRAM(s) Oral every 6 hours  simethicone 80 milliGRAM(s) Chew every 6 hours    MEDICATIONS  (PRN):  diphenhydrAMINE 25 milliGRAM(s) Oral every 6 hours PRN Pruritus  lanolin Ointment 1 Application(s) Topical every 6 hours PRN Sore Nipples  magnesium hydroxide Suspension 30 milliLiter(s) Oral two times a day PRN Constipation  oxyCODONE    IR 5 milliGRAM(s) Oral every 3 hours PRN Moderate to Severe Pain (4-10)  oxyCODONE    IR 5 milliGRAM(s) Oral once PRN Moderate to Severe Pain (4-10)      ASSESSMENT:  36y  y/o G   P    PO Day#        Delivery: Primary [  ]    Repeat [  ]                                       Indication of procedure:  Condition: Stable  Past Medical History significant for: HPI:  36 year old  @ 37.0 weeks, ARAMIS 2022, dated ETD5 consistent with 1st Trimester US, presents to L&D for scheduled primary  secondary to history of transabdominal cerclage. Patient has history of 20 week loss vaginal delivery complicated by retained products, with D&C and blood transfusion. Patient admits to abdominal surgery with transabdominal cerclage placed via " scar". NPO TIME 10:30 pm last night. Patient admits to normal fetal movement. Denies vaginal bleeding, leakage of fluid, painful contractions/lower abdominal cramping, fever/chills, shortness of breath,  chest pain, increased swelling, difficulty ambulating, loss of taste/smell, nausea/vomiting/diarrhea, rash, weakness, paresthesia, change in appetite, dizziness, lightheadedness, cough, nasal congestion, runny nose    OB History: : , eTOP D&C  G2: , eTOP D&C  G3: 10/3/2017, IVF pregnancy, @ 20.3 weeks PTL, vaginal delivery at [facility], complicated by retained POCs requiring D&C and 1 unit PRBC transfusion, without reaction  G4: , 6 weeks IVF complete   G5: Current pregnancy, IVF, s/p peds cardiology consult secondary to mild narrowing at isthmus cannot r/o coarctation of aorta    GYN Hx: Admits to history of small fibroids, endometriosis, PCOS, Denies STDs, abnormal pap smears     Med Hx: Denies asthma, HTN, DM, CAD, or other medical issues    Meds: PNV, denies other medications including prescribed/OTC     Allergies: NKDA, NKEA, NKFA    Surgical Hx: Hysteroscopy , 2018, diagnostic laparoscopy 2017, abdominal surgery - transabdominal cerclage placement , D&C x3     Vitals: ICU Vital Signs Last 24 Hrs  T(C): 36.9 (2022 06:30), Max: 36.9 (2022 06:30)  T(F): 98.4 (2022 06:30), Max: 98.4 (2022 06:30)  HR: 91 (2022 06:30) (91 - 91)  BP: 113/70 (2022 06:30) (113/70 - 113/70)  BP(mean): --  ABP: --  ABP(mean): --  RR: 14 (2022 06:30) (14 - 14)  SpO2: --    Gen: NAD, A+O x 3, resting comfortably  Resp: CTAB  Cardio: RRR  Abd: Gravid, soft, non-distended, non-tender to superficial and deep palpation in all 4 quadrants, no rebound/guarding  Ext: Warm, well perfused, 1+ nonpitting edema bilaterally    EFM: 135 bpm, moderate variability with spontaneous accelerations, no decelerations, Cat I tracing  [place]: Irritability and irregular contractions      (2022 06:44)    Current Issues: none  Heart:        RRR                      Lungs: clear  Breasts:  Soft [ x ]   Engorged [  ]  Abdomen: Soft [x  ] , distended [  ] nontender [ x ]   Bowel sounds :  Present [x  ]  Absent [  ]   Fundus firm [x  ]  Boggy [  ]  Abdominal incision: Clean, dry and intact [ x ]  Staples [  ] Steri Strips [  ] Dermabond [  ] Sutures [ x ] DON ( )  Patient wearing abdominal binder for support.  Vaginal: Lochia:  Heavy [  ]  Moderate [  ]   Scant [ x ]  Extremities: Edema [ 0 ] negative Anmol's Sign [x  ] Nontender Angel  [ x ] Positive pedal pulses [  x]  Other relevant physical exam findings: none      PLAN:  Plan: Increase ambulation, analgesia PRN and pain medication protocol standing oxycodone, ibuprofen and acetaminophen.  Diet: Regular diet  Continue routine post-operative and postpartum care.   Discharge Planning [ x ]  Consults:   Social Work [  ] Lacation [  ] Other [  ]

## 2022-02-09 ENCOUNTER — EMERGENCY (EMERGENCY)
Facility: HOSPITAL | Age: 37
LOS: 1 days | Discharge: HOME | End: 2022-02-09
Attending: EMERGENCY MEDICINE | Admitting: EMERGENCY MEDICINE
Payer: COMMERCIAL

## 2022-02-09 VITALS
DIASTOLIC BLOOD PRESSURE: 72 MMHG | WEIGHT: 178.13 LBS | RESPIRATION RATE: 18 BRPM | TEMPERATURE: 98 F | SYSTOLIC BLOOD PRESSURE: 123 MMHG | OXYGEN SATURATION: 100 % | HEIGHT: 63 IN | HEART RATE: 68 BPM

## 2022-02-09 DIAGNOSIS — Z98.890 OTHER SPECIFIED POSTPROCEDURAL STATES: Chronic | ICD-10-CM

## 2022-02-09 DIAGNOSIS — N93.9 ABNORMAL UTERINE AND VAGINAL BLEEDING, UNSPECIFIED: ICD-10-CM

## 2022-02-09 DIAGNOSIS — Z87.42 PERSONAL HISTORY OF OTHER DISEASES OF THE FEMALE GENITAL TRACT: Chronic | ICD-10-CM

## 2022-02-09 DIAGNOSIS — Z20.822 CONTACT WITH AND (SUSPECTED) EXPOSURE TO COVID-19: ICD-10-CM

## 2022-02-09 LAB
ALBUMIN SERPL ELPH-MCNC: 3.3 G/DL — SIGNIFICANT CHANGE UP (ref 3.3–5)
ALP SERPL-CCNC: 101 U/L — SIGNIFICANT CHANGE UP (ref 40–120)
ALT FLD-CCNC: 19 U/L — SIGNIFICANT CHANGE UP (ref 10–45)
ANION GAP SERPL CALC-SCNC: 10 MMOL/L — SIGNIFICANT CHANGE UP (ref 5–17)
APTT BLD: 33.2 SEC — SIGNIFICANT CHANGE UP (ref 27.5–35.5)
AST SERPL-CCNC: 20 U/L — SIGNIFICANT CHANGE UP (ref 10–40)
BILIRUB SERPL-MCNC: 0.3 MG/DL — SIGNIFICANT CHANGE UP (ref 0.2–1.2)
BLD GP AB SCN SERPL QL: NEGATIVE — SIGNIFICANT CHANGE UP
BUN SERPL-MCNC: 15 MG/DL — SIGNIFICANT CHANGE UP (ref 7–23)
CALCIUM SERPL-MCNC: 9.7 MG/DL — SIGNIFICANT CHANGE UP (ref 8.4–10.5)
CHLORIDE SERPL-SCNC: 106 MMOL/L — SIGNIFICANT CHANGE UP (ref 96–108)
CO2 SERPL-SCNC: 23 MMOL/L — SIGNIFICANT CHANGE UP (ref 22–31)
CREAT SERPL-MCNC: 0.74 MG/DL — SIGNIFICANT CHANGE UP (ref 0.5–1.3)
GLUCOSE SERPL-MCNC: 97 MG/DL — SIGNIFICANT CHANGE UP (ref 70–99)
HCT VFR BLD CALC: 31.3 % — LOW (ref 34.5–45)
HGB BLD-MCNC: 10.3 G/DL — LOW (ref 11.5–15.5)
INR BLD: 0.99 — SIGNIFICANT CHANGE UP (ref 0.88–1.16)
MCHC RBC-ENTMCNC: 32.7 PG — SIGNIFICANT CHANGE UP (ref 27–34)
MCHC RBC-ENTMCNC: 32.9 GM/DL — SIGNIFICANT CHANGE UP (ref 32–36)
MCV RBC AUTO: 99.4 FL — SIGNIFICANT CHANGE UP (ref 80–100)
PLATELET # BLD AUTO: 311 K/UL — SIGNIFICANT CHANGE UP (ref 150–400)
POTASSIUM SERPL-MCNC: 3.7 MMOL/L — SIGNIFICANT CHANGE UP (ref 3.5–5.3)
POTASSIUM SERPL-SCNC: 3.7 MMOL/L — SIGNIFICANT CHANGE UP (ref 3.5–5.3)
PROT SERPL-MCNC: 6.3 G/DL — SIGNIFICANT CHANGE UP (ref 6–8.3)
PROTHROM AB SERPL-ACNC: 11.9 SEC — SIGNIFICANT CHANGE UP (ref 10.6–13.6)
RBC # BLD: 3.15 M/UL — LOW (ref 3.8–5.2)
RBC # FLD: 13.5 % — SIGNIFICANT CHANGE UP (ref 10.3–14.5)
RH IG SCN BLD-IMP: POSITIVE — SIGNIFICANT CHANGE UP
SODIUM SERPL-SCNC: 139 MMOL/L — SIGNIFICANT CHANGE UP (ref 135–145)
WBC # BLD: 7.53 K/UL — SIGNIFICANT CHANGE UP (ref 3.8–10.5)
WBC # FLD AUTO: 7.53 K/UL — SIGNIFICANT CHANGE UP (ref 3.8–10.5)

## 2022-02-09 PROCEDURE — 99285 EMERGENCY DEPT VISIT HI MDM: CPT

## 2022-02-09 NOTE — ED ADULT TRIAGE NOTE - CHIEF COMPLAINT QUOTE
Pt reports vaginal bleeding since today. Pt reports multiple pads per hour used. Pt reports hx of  on 2/3/22. Pt reports vaginal bleeding since today and lightheadedness. Pt reports multiple pads per hour used. Pt reports hx of  on 2/3/22.

## 2022-02-09 NOTE — ED ADULT NURSE REASSESSMENT NOTE - NS ED NURSE REASSESS COMMENT FT1
Dr. Landis has spoken with OB, states will come down to see pt, EDT notified and moved to room M for exam, L currently occupied

## 2022-02-09 NOTE — ED ADULT NURSE NOTE - OBJECTIVE STATEMENT
Pt reports vaginal bleeding since today and lightheadedness. Pt reports multiple pads per hour used. Pt reports hx of  on 2/3/22.    states she has saturated 2 pads since earlier this evening, with onset at breast feeding

## 2022-02-09 NOTE — ED ADULT NURSE NOTE - CHIEF COMPLAINT QUOTE
Pt reports vaginal bleeding since today and lightheadedness. Pt reports multiple pads per hour used. Pt reports hx of  on 2/3/22.

## 2022-02-10 VITALS
RESPIRATION RATE: 17 BRPM | HEART RATE: 71 BPM | OXYGEN SATURATION: 99 % | SYSTOLIC BLOOD PRESSURE: 130 MMHG | DIASTOLIC BLOOD PRESSURE: 76 MMHG | TEMPERATURE: 98 F

## 2022-02-10 LAB
ANISOCYTOSIS BLD QL: SLIGHT — SIGNIFICANT CHANGE UP
BASOPHILS # BLD AUTO: 0 K/UL — SIGNIFICANT CHANGE UP (ref 0–0.2)
BASOPHILS NFR BLD AUTO: 0 % — SIGNIFICANT CHANGE UP (ref 0–2)
BURR CELLS BLD QL SMEAR: PRESENT — SIGNIFICANT CHANGE UP
EOSINOPHIL # BLD AUTO: 0.2 K/UL — SIGNIFICANT CHANGE UP (ref 0–0.5)
EOSINOPHIL NFR BLD AUTO: 2.6 % — SIGNIFICANT CHANGE UP (ref 0–6)
GIANT PLATELETS BLD QL SMEAR: PRESENT — SIGNIFICANT CHANGE UP
LYMPHOCYTES # BLD AUTO: 2.38 K/UL — SIGNIFICANT CHANGE UP (ref 1–3.3)
LYMPHOCYTES # BLD AUTO: 31.6 % — SIGNIFICANT CHANGE UP (ref 13–44)
MACROCYTES BLD QL: SLIGHT — SIGNIFICANT CHANGE UP
MANUAL SMEAR VERIFICATION: SIGNIFICANT CHANGE UP
MICROCYTES BLD QL: SLIGHT — SIGNIFICANT CHANGE UP
MONOCYTES # BLD AUTO: 0.66 K/UL — SIGNIFICANT CHANGE UP (ref 0–0.9)
MONOCYTES NFR BLD AUTO: 8.8 % — SIGNIFICANT CHANGE UP (ref 2–14)
NEUTROPHILS # BLD AUTO: 4.29 K/UL — SIGNIFICANT CHANGE UP (ref 1.8–7.4)
NEUTROPHILS NFR BLD AUTO: 57 % — SIGNIFICANT CHANGE UP (ref 43–77)
OVALOCYTES BLD QL SMEAR: SLIGHT — SIGNIFICANT CHANGE UP
PLAT MORPH BLD: ABNORMAL
POIKILOCYTOSIS BLD QL AUTO: SLIGHT — SIGNIFICANT CHANGE UP
POLYCHROMASIA BLD QL SMEAR: SLIGHT — SIGNIFICANT CHANGE UP
RBC BLD AUTO: ABNORMAL
SARS-COV-2 RNA SPEC QL NAA+PROBE: NEGATIVE — SIGNIFICANT CHANGE UP

## 2022-02-10 PROCEDURE — 86900 BLOOD TYPING SEROLOGIC ABO: CPT

## 2022-02-10 PROCEDURE — 85025 COMPLETE CBC W/AUTO DIFF WBC: CPT

## 2022-02-10 PROCEDURE — 86850 RBC ANTIBODY SCREEN: CPT

## 2022-02-10 PROCEDURE — 85610 PROTHROMBIN TIME: CPT

## 2022-02-10 PROCEDURE — 76856 US EXAM PELVIC COMPLETE: CPT

## 2022-02-10 PROCEDURE — 76856 US EXAM PELVIC COMPLETE: CPT | Mod: 26

## 2022-02-10 PROCEDURE — 99284 EMERGENCY DEPT VISIT MOD MDM: CPT | Mod: 25

## 2022-02-10 PROCEDURE — 36415 COLL VENOUS BLD VENIPUNCTURE: CPT

## 2022-02-10 PROCEDURE — 86901 BLOOD TYPING SEROLOGIC RH(D): CPT

## 2022-02-10 PROCEDURE — 85730 THROMBOPLASTIN TIME PARTIAL: CPT

## 2022-02-10 PROCEDURE — 76830 TRANSVAGINAL US NON-OB: CPT

## 2022-02-10 PROCEDURE — 80053 COMPREHEN METABOLIC PANEL: CPT

## 2022-02-10 PROCEDURE — 76830 TRANSVAGINAL US NON-OB: CPT | Mod: 26

## 2022-02-10 PROCEDURE — 87635 SARS-COV-2 COVID-19 AMP PRB: CPT

## 2022-02-10 RX ORDER — ACETAMINOPHEN 500 MG
650 TABLET ORAL ONCE
Refills: 0 | Status: COMPLETED | OUTPATIENT
Start: 2022-02-10 | End: 2022-02-10

## 2022-02-10 RX ADMIN — Medication 650 MILLIGRAM(S): at 01:45

## 2022-02-10 RX ADMIN — Medication 650 MILLIGRAM(S): at 01:48

## 2022-02-10 RX ADMIN — Medication 0.2 MILLIGRAM(S): at 01:37

## 2022-02-10 NOTE — CONSULT NOTE ADULT - SUBJECTIVE AND OBJECTIVE BOX
Patient is a 36 y.o.  POD#7 s/p pLTCS due to presence of abdominal cerclage who presents for 2 episodes fo brisk vaginal bleeding today that have since subsided. Patient states that her vaginal bleeding postpartum had largely resolved yesterday and that she has since only had brown spotting. Then today @1900, she experienced a large gush of bright red blood while pumping that filled a pad. The same thing happened again around  when she began breastfeeding. She became concerned, so she called her doctor who told her to come in. She had minimal cramping at this time, but no more than usual for when she is breastfeeding or pumping. Patient denies fever, chills, chest pain, SOB, nausea, vomiting. She states that since the second episode, her vaginal bleeding has been light, similar to prior to the first episode.      Ob hx: 2/3/22 pLTCS @37w @[facility] for transabdominal cerclage, uncomplicated (fetus was suspected to have coarctation, thus delivery at [facility], however  echo WNL). 2017 previable VD @21w c/b PPH 2/2 retained placenta requiring urgent D&C and transfusion 2u pRBCs. 2018 SAb. TAb D&C x2.  Gyn hx: Endometriosis, adenomyosis, PCOS, small fibroids  PMH: Denies  Meds: Motrin, Tylenol, PNV  PSH: C/S as above, 2017 transabdominal cerclage, D&Cs as above. 2016 l/s endo excision. 2016 and 2017 hysteroscopy D&C.  Allergies: NDKA      Vital Signs Last 24 Hrs  T(C): 36.6 (2022 22:42), Max: 36.6 (2022 22:42)  T(F): 97.8 (2022 22:42), Max: 97.8 (2022 22:42)  HR: 68 (2022 22:42) (68 - 68)  BP: 123/72 (2022 22:42) (123/72 - 123/72)  BP(mean): --  RR: 18 (2022 22:42) (18 - 18)  SpO2: 100% (2022 22:42) (100% - 100%)      Physical Exam  Gen: Well-appearing. NAD.  Resp: Breathing comfortably on RA.  Abd: Soft, appropriately tender post-op, non-distended, no rebound or guarding. Fundus firm below umbilicus. No bleeding with gentle fundal massage.  Pelvic exam: Patient declined. However, no blood on peripad, which patient has been wearing for 1.5 hours.  Ext: No calf tenderness.      LABS:                        10.3   7.53  )-----------( 311      ( 2022 23:14 )             31.3         139  |  106  |  15  ----------------------------<  97  3.7   |  23  |  0.74    Ca    9.7      2022 23:14    TPro  6.3  /  Alb  3.3  /  TBili  0.3  /  DBili  x   /  AST  20  /  ALT  19  /  AlkPhos  101  02-09    PT/INR - ( 2022 23:15 )   PT: 11.9 sec;   INR: 0.99          PTT - ( 2022 23:15 )  PTT:33.2 sec        RADIOLOGY & ADDITIONAL STUDIES:

## 2022-02-10 NOTE — CONSULT NOTE ADULT - ASSESSMENT
36 y.o.  POD#7 s/p pLTCS with 2 episodes of brisk vaginal bleeding, now resolved. US shows complex material within the endometrium, likely blood products/clots. Hgb is stable from prior and VS WNL. Brisk bleeding that stops suddenly can be consistent with subinvolution of the placental bed, which is self-limiting. Additionally, given the presence of an abdominal cerclage, there can be delayed clearance of blood products from the endometrial cavity due to the closed cervix. Oxytocin release from breastfeed and/or pumping can induce uterine contractions that briefly increase the amount of bleeding. Patient can expect that she may continue to have episodes of bleeding for the next few weeks.    - Patient is clinically and hemodynamically stable with no vaginal bleeding at this time  - Please give IM methergine 0.2mg x1  - Please prescribe PO methergine 0.2mg q6 x4 doses  - Patient should f/u with Dr. Brunson in the office next week  - Patient should call her doctor or return to the ED if she has heavy vaginal bleeding >2pads/hour for 2 hours or more, or if she experiences chest pain, SOB, or syncope.    Poppy Balbuena MD PGY2  AIMEE Su MD PGY4  AIMEE Brunson MD

## 2022-02-10 NOTE — CONSULT NOTE ADULT - ATTENDING COMMENTS
Agree with plan for conservative management and medication at this time.   Will follow closely   Anticipate recovery.   Will start iron supplement and follow up in the office

## 2022-02-10 NOTE — ED PROVIDER NOTE - CLINICAL SUMMARY MEDICAL DECISION MAKING FREE TEXT BOX
vaginal bleeding, pt s/p  one week ago, afebrile, no hypotension, no tachycardia  -check labs  -US  -GYN consulted

## 2022-02-10 NOTE — ED PROVIDER NOTE - PROGRESS NOTE DETAILS
pt seen by GYN - unlikely products of conception, likely clots.  pt can be discharged home on methergine  I have discussed the discharge plan with the patient. The patient agrees with the plan, as discussed.  The patient understands Emergency Department diagnosis is a preliminary diagnosis often based on limited information and that the patient must adhere to the follow-up plan as discussed.  The patient understands that if the symptoms worsen or if prescribed medications do not have the desired/planned effect that the patient may return to the Emergency Department at any time for further evaluation and treatment.

## 2022-02-10 NOTE — ED PROVIDER NOTE - PATIENT PORTAL LINK FT
You can access the FollowMyHealth Patient Portal offered by [facility] by registering at the following website: [email or web address]. By joining [email or web address]’s FollowMyHealth portal, you will also be able to view your health information using other applications (apps) compatible with our system.

## 2022-02-10 NOTE — ED PROVIDER NOTE - OBJECTIVE STATEMENT
36F s/p  2/3 c/o vaginal bleeding. pt states she had stopped bleeding and just had some brown discharge. states during the day had sudden onset bright red blood bleeding.  no clots, no cramping. states occurred while she was breast pumping and breastfeeding.  states she soaked through an overnight pad quickly.

## 2022-02-10 NOTE — ED PROVIDER NOTE - NSFOLLOWUPINSTRUCTIONS_ED_ALL_ED_FT
Follow-up with Dr. Brunson       Delivery, Care After    This sheet gives you information about how to care for yourself after your procedure. Your health care provider may also give you more specific instructions. If you have problems or questions, contact your health care provider.    What can I expect after the procedure?    After the procedure, it is common to have:  •A small amount of blood or clear fluid coming from the incision.    •Some redness, swelling, and pain in your incision area.    •Some abdominal pain and soreness.    •Vaginal bleeding (lochia). Even though you did not have a vaginal delivery, you will still have vaginal bleeding and discharge.    •Pelvic cramps.    •Fatigue.    You may have pain, swelling, and discomfort in the tissue between your vagina and your anus (perineum) if:  •Your  was unplanned, and you were allowed to labor and push.    •An incision was made in the area (episiotomy) or the tissue tore during attempted vaginal delivery.    Follow these instructions at home:    Incision care    •Follow instructions from your health care provider about how to take care of your incision. Make sure you:  •Wash your hands with soap and water before you change your bandage (dressing). If soap and water are not available, use hand .    •If you have a dressing, change it or remove it as told by your health care provider.    •Leave stitches (sutures), skin staples, skin glue, or adhesive strips in place. These skin closures may need to stay in place for 2 weeks or longer. If adhesive strip edges start to loosen and curl up, you may trim the loose edges. Do not remove adhesive strips completely unless your health care provider tells you to do that.    •Check your incision area every day for signs of infection. Check for:  •More redness, swelling, or pain.    •More fluid or blood.    •Warmth.    •Pus or a bad smell.    • Do not take baths, swim, or use a hot tub until your health care provider says it's okay. Ask your health care provider if you can take showers.    •When you cough or sneeze, hug a pillow. This helps with pain and decreases the chance of your incision opening up (dehiscing). Do this until your incision heals.    Medicines     •Take over-the-counter and prescription medicines only as told by your health care provider.    •If you were prescribed an antibiotic medicine, take it as told by your health care provider. Do not stop taking the antibiotic even if you start to feel better.    • Do not drive or use heavy machinery while taking prescription pain medicine.    Lifestyle     • Do not drink alcohol. This is especially important if you are breastfeeding or taking pain medicine.    • Do not use any products that contain nicotine or tobacco, such as cigarettes, e-cigarettes, and chewing tobacco. If you need help quitting, ask your health care provider.    Eating and drinking     •Drink at least 8 eight-ounce glasses of water every day unless told not to by your health care provider. If you breastfeed, you may need to drink even more water.    •Eat high-fiber foods every day. These foods may help prevent or relieve constipation. High-fiber foods include:  •Whole grain cereals and breads.    •Brown rice.    •Beans.    •Fresh fruits and vegetables.    Activity      •If possible, have someone help you care for your baby and help with household activities for at least a few days after you leave the hospital.    •Return to your normal activities as told by your health care provider. Ask your health care provider what activities are safe for you.    •Rest as much as possible. Try to rest or take a nap while your baby is sleeping.    • Do not lift anything that is heavier than 10 lbs (4.5 kg), or the limit that you were told, until your health care provider says that it is safe.    •Talk with your health care provider about when you can engage in sexual activity. This may depend on your:  •Risk of infection.    •How fast you heal.    •Comfort and desire to engage in sexual activity.    General instructions     •  Do not use tampons or douches until your health care provider approves.    •Wear loose, comfortable clothing and a supportive and well-fitting bra.    •Keep your perineum clean and dry. Wipe from front to back when you use the toilet.    •If you pass a blood clot, save it and call your health care provider to discuss. Do not flush blood clots down the toilet before you get instructions from your health care provider.    •Keep all follow-up visits for you and your baby as told by your health care provider. This is important.    Contact a health care provider if:  •You have:  •A fever.    •Bad-smelling vaginal discharge.    •Pus or a bad smell coming from your incision.    •Difficulty or pain when urinating.    •A sudden increase or decrease in the frequency of your bowel movements.    •More redness, swelling, or pain around your incision.    •More fluid or blood coming from your incision.    •A rash.    •Nausea.    •Little or no interest in activities you used to enjoy.    •Questions about caring for yourself or your baby.    •Your incision feels warm to the touch.    •Your breasts turn red or become painful or hard.    •You feel unusually sad or worried.    •You vomit.    •You pass a blood clot from your vagina.    •You urinate more than usual.    •You are dizzy or light-headed.    Get help right away if:  •You have:  •Pain that does not go away or get better with medicine.    •Chest pain.    •Difficulty breathing.    •Blurred vision or spots in your vision.    •Thoughts about hurting yourself or your baby.    •New pain in your abdomen or in one of your legs.    •A severe headache.    •You faint.    •You bleed from your vagina so much that you fill more than one sanitary pad in one hour. Bleeding should not be heavier than your heaviest period.    Summary    •After the procedure, it is common to have pain at your incision site, abdominal cramping, and slight bleeding from your vagina.    •Check your incision area every day for signs of infection.    •Tell your health care provider about any unusual symptoms.    •Keep all follow-up visits for you and your baby as told by your health care provider.    This information is not intended to replace advice given to you by your health care provider. Make sure you discuss any questions you have with your health care provider.

## 2022-06-15 PROBLEM — Z87.59 PERSONAL HISTORY OF OTHER COMPLICATIONS OF PREGNANCY, CHILDBIRTH AND THE PUERPERIUM: Chronic | Status: ACTIVE | Noted: 2022-01-28

## 2022-06-15 PROBLEM — U07.1 COVID-19: Chronic | Status: ACTIVE | Noted: 2022-01-28

## 2022-06-15 PROBLEM — B00.9 HERPESVIRAL INFECTION, UNSPECIFIED: Chronic | Status: ACTIVE | Noted: 2022-01-28

## 2022-06-15 PROBLEM — O03.9 COMPLETE OR UNSPECIFIED SPONTANEOUS ABORTION WITHOUT COMPLICATION: Chronic | Status: ACTIVE | Noted: 2022-01-28

## 2022-06-15 PROBLEM — Z87.42 PERSONAL HISTORY OF OTHER DISEASES OF THE FEMALE GENITAL TRACT: Chronic | Status: ACTIVE | Noted: 2022-01-28

## 2023-01-04 NOTE — ED PROVIDER NOTE - CROS ED [facility] ALL NEG
Last A1c reviewed-   Lab Results   Component Value Date    HGBA1C 7.5 (H) 12/29/2022     Antihyperglycemics (From admission, onward)    Start     Stop Route Frequency Ordered    01/04/23 1417  insulin aspart U-100 injection 0-5 Units         -- SubQ Before meals & nightly PRN 01/04/23 1319        Continue home meds,   Mild SSI with hypoglycemia protocol  Diabetic diet.  Most glucoses acceptable. A few excursions above hospital goal. Monitor.    negative...

## 2023-01-10 ENCOUNTER — NON-APPOINTMENT (OUTPATIENT)
Age: 38
End: 2023-01-10

## (undated) DEVICE — DRSG DERMABOND PRINEO 22CM